# Patient Record
Sex: FEMALE | Race: ASIAN | Employment: UNEMPLOYED | URBAN - METROPOLITAN AREA
[De-identification: names, ages, dates, MRNs, and addresses within clinical notes are randomized per-mention and may not be internally consistent; named-entity substitution may affect disease eponyms.]

---

## 2019-07-31 ENCOUNTER — OFFICE VISIT (OUTPATIENT)
Dept: INTERNAL MEDICINE CLINIC | Facility: CLINIC | Age: 39
End: 2019-07-31
Payer: COMMERCIAL

## 2019-07-31 VITALS
HEART RATE: 59 BPM | HEIGHT: 64 IN | SYSTOLIC BLOOD PRESSURE: 124 MMHG | BODY MASS INDEX: 35.17 KG/M2 | TEMPERATURE: 98 F | WEIGHT: 206 LBS | DIASTOLIC BLOOD PRESSURE: 80 MMHG

## 2019-07-31 DIAGNOSIS — Z00.00 ANNUAL PHYSICAL EXAM: Primary | ICD-10-CM

## 2019-07-31 PROCEDURE — 99385 PREV VISIT NEW AGE 18-39: CPT | Performed by: INTERNAL MEDICINE

## 2019-07-31 NOTE — PROGRESS NOTES
HPI:    Patient ID: Tomy Dumas is a 42year old female. Patient presents today for her annual physical. She has no specific complaint. Review of Systems   Constitutional: Negative. HENT: Negative. Eyes: Negative. Respiratory: Negative. noted. She is not diaphoretic. No erythema. No pallor. Psychiatric: She has a normal mood and affect.               ASSESSMENT/PLAN:   (Z00.00) Annual physical exam  (primary encounter diagnosis)  Plan: CBC WITH DIFFERENTIAL WITH PLATELET, COMP         ME

## 2019-08-02 ENCOUNTER — LAB ENCOUNTER (OUTPATIENT)
Dept: LAB | Age: 39
End: 2019-08-02
Attending: INTERNAL MEDICINE
Payer: COMMERCIAL

## 2019-08-02 DIAGNOSIS — Z00.00 ANNUAL PHYSICAL EXAM: ICD-10-CM

## 2019-08-02 LAB
ALBUMIN SERPL-MCNC: 3.7 G/DL (ref 3.4–5)
ALBUMIN/GLOB SERPL: 1 {RATIO} (ref 1–2)
ALP LIVER SERPL-CCNC: 66 U/L (ref 37–98)
ALT SERPL-CCNC: 18 U/L (ref 13–56)
ANION GAP SERPL CALC-SCNC: 6 MMOL/L (ref 0–18)
AST SERPL-CCNC: 14 U/L (ref 15–37)
BASOPHILS # BLD AUTO: 0.04 X10(3) UL (ref 0–0.2)
BASOPHILS NFR BLD AUTO: 0.7 %
BILIRUB SERPL-MCNC: 0.4 MG/DL (ref 0.1–2)
BUN BLD-MCNC: 8 MG/DL (ref 7–18)
BUN/CREAT SERPL: 12.9 (ref 10–20)
CALCIUM BLD-MCNC: 8.3 MG/DL (ref 8.5–10.1)
CHLORIDE SERPL-SCNC: 110 MMOL/L (ref 98–112)
CHOLEST SMN-MCNC: 164 MG/DL (ref ?–200)
CO2 SERPL-SCNC: 25 MMOL/L (ref 21–32)
CREAT BLD-MCNC: 0.62 MG/DL (ref 0.55–1.02)
DEPRECATED RDW RBC AUTO: 47.9 FL (ref 35.1–46.3)
EOSINOPHIL # BLD AUTO: 0.12 X10(3) UL (ref 0–0.7)
EOSINOPHIL NFR BLD AUTO: 2.1 %
ERYTHROCYTE [DISTWIDTH] IN BLOOD BY AUTOMATED COUNT: 14.8 % (ref 11–15)
GLOBULIN PLAS-MCNC: 3.8 G/DL (ref 2.8–4.4)
GLUCOSE BLD-MCNC: 95 MG/DL (ref 70–99)
HCT VFR BLD AUTO: 43.3 % (ref 35–48)
HDLC SERPL-MCNC: 40 MG/DL (ref 40–59)
HGB BLD-MCNC: 14.1 G/DL (ref 12–16)
IMM GRANULOCYTES # BLD AUTO: 0.01 X10(3) UL (ref 0–1)
IMM GRANULOCYTES NFR BLD: 0.2 %
LDLC SERPL CALC-MCNC: 99 MG/DL (ref ?–100)
LYMPHOCYTES # BLD AUTO: 2.06 X10(3) UL (ref 1–4)
LYMPHOCYTES NFR BLD AUTO: 36 %
M PROTEIN MFR SERPL ELPH: 7.5 G/DL (ref 6.4–8.2)
MCH RBC QN AUTO: 28.9 PG (ref 26–34)
MCHC RBC AUTO-ENTMCNC: 32.6 G/DL (ref 31–37)
MCV RBC AUTO: 88.7 FL (ref 80–100)
MONOCYTES # BLD AUTO: 0.44 X10(3) UL (ref 0.1–1)
MONOCYTES NFR BLD AUTO: 7.7 %
NEUTROPHILS # BLD AUTO: 3.05 X10 (3) UL (ref 1.5–7.7)
NEUTROPHILS # BLD AUTO: 3.05 X10(3) UL (ref 1.5–7.7)
NEUTROPHILS NFR BLD AUTO: 53.3 %
NONHDLC SERPL-MCNC: 124 MG/DL (ref ?–130)
OSMOLALITY SERPL CALC.SUM OF ELEC: 290 MOSM/KG (ref 275–295)
PATIENT FASTING: YES
PATIENT FASTING: YES
PLATELET # BLD AUTO: 343 10(3)UL (ref 150–450)
POTASSIUM SERPL-SCNC: 3.6 MMOL/L (ref 3.5–5.1)
RBC # BLD AUTO: 4.88 X10(6)UL (ref 3.8–5.3)
SODIUM SERPL-SCNC: 141 MMOL/L (ref 136–145)
TRIGL SERPL-MCNC: 123 MG/DL (ref 30–149)
VLDLC SERPL CALC-MCNC: 25 MG/DL (ref 0–30)
WBC # BLD AUTO: 5.7 X10(3) UL (ref 4–11)

## 2019-08-02 PROCEDURE — 80061 LIPID PANEL: CPT

## 2019-08-02 PROCEDURE — 80053 COMPREHEN METABOLIC PANEL: CPT

## 2019-08-02 PROCEDURE — 36415 COLL VENOUS BLD VENIPUNCTURE: CPT

## 2019-08-02 PROCEDURE — 85025 COMPLETE CBC W/AUTO DIFF WBC: CPT

## 2019-08-06 ENCOUNTER — OFFICE VISIT (OUTPATIENT)
Dept: OBGYN CLINIC | Facility: CLINIC | Age: 39
End: 2019-08-06
Payer: COMMERCIAL

## 2019-08-06 VITALS
WEIGHT: 203.81 LBS | SYSTOLIC BLOOD PRESSURE: 139 MMHG | DIASTOLIC BLOOD PRESSURE: 91 MMHG | HEIGHT: 64 IN | BODY MASS INDEX: 34.8 KG/M2

## 2019-08-06 DIAGNOSIS — Z01.419 WELL WOMAN EXAM WITH ROUTINE GYNECOLOGICAL EXAM: Primary | ICD-10-CM

## 2019-08-06 PROCEDURE — 99385 PREV VISIT NEW AGE 18-39: CPT | Performed by: OBSTETRICS & GYNECOLOGY

## 2019-08-06 NOTE — PROGRESS NOTES
Annual Gyn Exam    HPI Mercedez Bo is a new patient to me and is here for an annual gynecologic evaluation. She is currently without complaints.     OB History    Para Term  AB Living   1 1 1     1   SAB TAB Ectopic Multiple Live Births           1 headaches. Physical Exam   Constitutional: She is oriented to person, place, and time. She appears well-nourished. No distress. HENT:   Mouth/Throat: Oropharynx is clear and moist. No oropharyngeal exudate. Neck: No thyromegaly present.    Cardiov COLLECTION;  Future        Severa Dixons, MD, MD

## 2019-08-07 LAB — HPV I/H RISK 1 DNA SPEC QL NAA+PROBE: NEGATIVE

## 2019-11-15 ENCOUNTER — IMMUNIZATION (OUTPATIENT)
Dept: FAMILY MEDICINE CLINIC | Facility: CLINIC | Age: 39
End: 2019-11-15
Payer: COMMERCIAL

## 2019-11-15 DIAGNOSIS — Z23 NEED FOR VACCINATION: ICD-10-CM

## 2019-11-15 PROCEDURE — 90471 IMMUNIZATION ADMIN: CPT | Performed by: NURSE PRACTITIONER

## 2019-11-15 PROCEDURE — 90686 IIV4 VACC NO PRSV 0.5 ML IM: CPT | Performed by: NURSE PRACTITIONER

## 2020-07-29 ENCOUNTER — OFFICE VISIT (OUTPATIENT)
Dept: INTERNAL MEDICINE CLINIC | Facility: CLINIC | Age: 40
End: 2020-07-29
Payer: COMMERCIAL

## 2020-07-29 ENCOUNTER — LAB ENCOUNTER (OUTPATIENT)
Dept: LAB | Age: 40
End: 2020-07-29
Attending: INTERNAL MEDICINE
Payer: COMMERCIAL

## 2020-07-29 VITALS
RESPIRATION RATE: 12 BRPM | HEIGHT: 64 IN | BODY MASS INDEX: 35 KG/M2 | DIASTOLIC BLOOD PRESSURE: 80 MMHG | WEIGHT: 205 LBS | HEART RATE: 66 BPM | SYSTOLIC BLOOD PRESSURE: 120 MMHG | TEMPERATURE: 99 F

## 2020-07-29 DIAGNOSIS — Z00.00 ANNUAL PHYSICAL EXAM: ICD-10-CM

## 2020-07-29 DIAGNOSIS — Z00.00 ANNUAL PHYSICAL EXAM: Primary | ICD-10-CM

## 2020-07-29 LAB
ALBUMIN SERPL-MCNC: 3.8 G/DL (ref 3.4–5)
ALBUMIN/GLOB SERPL: 1 {RATIO} (ref 1–2)
ALP LIVER SERPL-CCNC: 60 U/L (ref 37–98)
ALT SERPL-CCNC: 32 U/L (ref 13–56)
ANION GAP SERPL CALC-SCNC: 4 MMOL/L (ref 0–18)
AST SERPL-CCNC: 15 U/L (ref 15–37)
BASOPHILS # BLD AUTO: 0.04 X10(3) UL (ref 0–0.2)
BASOPHILS NFR BLD AUTO: 0.6 %
BILIRUB SERPL-MCNC: 0.5 MG/DL (ref 0.1–2)
BUN BLD-MCNC: 14 MG/DL (ref 7–18)
BUN/CREAT SERPL: 25 (ref 10–20)
CALCIUM BLD-MCNC: 8.2 MG/DL (ref 8.5–10.1)
CHLORIDE SERPL-SCNC: 108 MMOL/L (ref 98–112)
CHOLEST SMN-MCNC: 191 MG/DL (ref ?–200)
CO2 SERPL-SCNC: 26 MMOL/L (ref 21–32)
CREAT BLD-MCNC: 0.56 MG/DL (ref 0.55–1.02)
DEPRECATED RDW RBC AUTO: 44.1 FL (ref 35.1–46.3)
EOSINOPHIL # BLD AUTO: 0.09 X10(3) UL (ref 0–0.7)
EOSINOPHIL NFR BLD AUTO: 1.3 %
ERYTHROCYTE [DISTWIDTH] IN BLOOD BY AUTOMATED COUNT: 13 % (ref 11–15)
GLOBULIN PLAS-MCNC: 3.9 G/DL (ref 2.8–4.4)
GLUCOSE BLD-MCNC: 86 MG/DL (ref 70–99)
HCT VFR BLD AUTO: 44 % (ref 35–48)
HDLC SERPL-MCNC: 33 MG/DL (ref 40–59)
HGB BLD-MCNC: 14.3 G/DL (ref 12–16)
IMM GRANULOCYTES # BLD AUTO: 0.01 X10(3) UL (ref 0–1)
IMM GRANULOCYTES NFR BLD: 0.1 %
LDLC SERPL CALC-MCNC: 136 MG/DL (ref ?–100)
LYMPHOCYTES # BLD AUTO: 2.12 X10(3) UL (ref 1–4)
LYMPHOCYTES NFR BLD AUTO: 31.5 %
M PROTEIN MFR SERPL ELPH: 7.7 G/DL (ref 6.4–8.2)
MCH RBC QN AUTO: 30.2 PG (ref 26–34)
MCHC RBC AUTO-ENTMCNC: 32.5 G/DL (ref 31–37)
MCV RBC AUTO: 93 FL (ref 80–100)
MONOCYTES # BLD AUTO: 0.46 X10(3) UL (ref 0.1–1)
MONOCYTES NFR BLD AUTO: 6.8 %
NEUTROPHILS # BLD AUTO: 4 X10 (3) UL (ref 1.5–7.7)
NEUTROPHILS # BLD AUTO: 4 X10(3) UL (ref 1.5–7.7)
NEUTROPHILS NFR BLD AUTO: 59.7 %
NONHDLC SERPL-MCNC: 158 MG/DL (ref ?–130)
OSMOLALITY SERPL CALC.SUM OF ELEC: 286 MOSM/KG (ref 275–295)
PATIENT FASTING Y/N/NP: YES
PATIENT FASTING Y/N/NP: YES
PLATELET # BLD AUTO: 323 10(3)UL (ref 150–450)
POTASSIUM SERPL-SCNC: 3.6 MMOL/L (ref 3.5–5.1)
RBC # BLD AUTO: 4.73 X10(6)UL (ref 3.8–5.3)
SODIUM SERPL-SCNC: 138 MMOL/L (ref 136–145)
TRIGL SERPL-MCNC: 109 MG/DL (ref 30–149)
TSI SER-ACNC: 3.37 MIU/ML (ref 0.36–3.74)
VLDLC SERPL CALC-MCNC: 22 MG/DL (ref 0–30)
WBC # BLD AUTO: 6.7 X10(3) UL (ref 4–11)

## 2020-07-29 PROCEDURE — 3008F BODY MASS INDEX DOCD: CPT | Performed by: INTERNAL MEDICINE

## 2020-07-29 PROCEDURE — 80053 COMPREHEN METABOLIC PANEL: CPT

## 2020-07-29 PROCEDURE — 85025 COMPLETE CBC W/AUTO DIFF WBC: CPT

## 2020-07-29 PROCEDURE — 3079F DIAST BP 80-89 MM HG: CPT | Performed by: INTERNAL MEDICINE

## 2020-07-29 PROCEDURE — 84443 ASSAY THYROID STIM HORMONE: CPT

## 2020-07-29 PROCEDURE — 36415 COLL VENOUS BLD VENIPUNCTURE: CPT

## 2020-07-29 PROCEDURE — 99395 PREV VISIT EST AGE 18-39: CPT | Performed by: INTERNAL MEDICINE

## 2020-07-29 PROCEDURE — 3074F SYST BP LT 130 MM HG: CPT | Performed by: INTERNAL MEDICINE

## 2020-07-29 PROCEDURE — 80061 LIPID PANEL: CPT

## 2020-07-29 RX ORDER — ZINC SULFATE CAP 220 MG (50 MG ELEMENTAL ZN) 220 (50 ZN) MG
50 CAP ORAL DAILY
COMMUNITY

## 2020-07-29 RX ORDER — MULTIVIT WITH MINERALS/LUTEIN
1000 TABLET ORAL DAILY
COMMUNITY

## 2020-07-29 NOTE — PROGRESS NOTES
HPI:    Patient ID: Inés Renteria is a 47year old female. Patient presents today for her annual physical examination otherwise had no complaints. Review of Systems   Constitutional: Negative.   Negative for appetite change, fever and unexpected weig no rales. Abdominal: Soft. Bowel sounds are normal. She exhibits no distension and no mass. There is no tenderness. There is no rebound and no guarding. Musculoskeletal: Normal range of motion. General: No tenderness or edema.    Lymphadenopathy

## 2020-08-02 ENCOUNTER — TELEPHONE (OUTPATIENT)
Dept: INTERNAL MEDICINE CLINIC | Facility: CLINIC | Age: 40
End: 2020-08-02

## 2020-08-02 DIAGNOSIS — R79.89 LOW SERUM CALCIUM: Primary | ICD-10-CM

## 2020-08-11 ENCOUNTER — APPOINTMENT (OUTPATIENT)
Dept: LAB | Age: 40
End: 2020-08-11
Attending: INTERNAL MEDICINE
Payer: COMMERCIAL

## 2020-08-11 ENCOUNTER — OFFICE VISIT (OUTPATIENT)
Dept: OBGYN CLINIC | Facility: CLINIC | Age: 40
End: 2020-08-11
Payer: COMMERCIAL

## 2020-08-11 VITALS — BODY MASS INDEX: 34 KG/M2 | DIASTOLIC BLOOD PRESSURE: 65 MMHG | WEIGHT: 198 LBS | SYSTOLIC BLOOD PRESSURE: 121 MMHG

## 2020-08-11 DIAGNOSIS — Z30.09 GENERAL COUNSELING AND ADVICE ON FEMALE CONTRACEPTION: ICD-10-CM

## 2020-08-11 DIAGNOSIS — Z01.419 WELL WOMAN EXAM WITH ROUTINE GYNECOLOGICAL EXAM: Primary | ICD-10-CM

## 2020-08-11 DIAGNOSIS — R79.89 LOW SERUM CALCIUM: ICD-10-CM

## 2020-08-11 LAB — PTH-INTACT SERPL-MCNC: 80.4 PG/ML (ref 18.5–88)

## 2020-08-11 PROCEDURE — 82330 ASSAY OF CALCIUM: CPT

## 2020-08-11 PROCEDURE — 82306 VITAMIN D 25 HYDROXY: CPT

## 2020-08-11 PROCEDURE — 36415 COLL VENOUS BLD VENIPUNCTURE: CPT

## 2020-08-11 PROCEDURE — 99395 PREV VISIT EST AGE 18-39: CPT | Performed by: OBSTETRICS & GYNECOLOGY

## 2020-08-11 PROCEDURE — 3074F SYST BP LT 130 MM HG: CPT | Performed by: OBSTETRICS & GYNECOLOGY

## 2020-08-11 PROCEDURE — 83970 ASSAY OF PARATHORMONE: CPT

## 2020-08-11 PROCEDURE — 3078F DIAST BP <80 MM HG: CPT | Performed by: OBSTETRICS & GYNECOLOGY

## 2020-08-11 NOTE — PROGRESS NOTES
Annual Gyn Exam    HPI  Margaret Regalado is a known patient of mine and is here for an annual gynecologic evaluation. She is without any complaints.     OB History    Para Term  AB Living   1 1 1     1   SAB TAB Ectopic Multiple Live Births           1 Systems   Constitutional: Negative for chills, fatigue and fever. HENT: Negative for hearing loss. Eyes: Negative for visual disturbance. Respiratory: Negative for cough, shortness of breath and wheezing.     Cardiovascular: Negative for palpitations exam with routine gynecological exam  A: 47 y.o.  here for annual gynecologic evaluation  Exam, self breast exam done  Pt not due for pap due to normal Pap and negative HPV last year. .  Discussed contraception and patient considering IUDs.   Discusse

## 2020-08-12 LAB — 25(OH)D3 SERPL-MCNC: 30.4 NG/ML (ref 30–100)

## 2020-08-14 LAB
CALCIUM IONIZED PH 7.4: 1.23 MMOL/L
CALCIUM IONIZED, SERUM: 1.3 MMOL/L

## 2020-10-30 ENCOUNTER — IMMUNIZATION (OUTPATIENT)
Dept: FAMILY MEDICINE CLINIC | Facility: CLINIC | Age: 40
End: 2020-10-30
Payer: COMMERCIAL

## 2020-10-30 PROCEDURE — 90686 IIV4 VACC NO PRSV 0.5 ML IM: CPT | Performed by: NURSE PRACTITIONER

## 2020-10-30 PROCEDURE — 90471 IMMUNIZATION ADMIN: CPT | Performed by: NURSE PRACTITIONER

## 2021-04-09 DIAGNOSIS — Z23 NEED FOR VACCINATION: ICD-10-CM

## 2021-04-14 ENCOUNTER — IMMUNIZATION (OUTPATIENT)
Dept: LAB | Age: 41
End: 2021-04-14
Attending: HOSPITALIST
Payer: COMMERCIAL

## 2021-04-14 DIAGNOSIS — Z23 NEED FOR VACCINATION: Primary | ICD-10-CM

## 2021-04-14 PROCEDURE — 0001A SARSCOV2 VAC 30MCG/0.3ML IM: CPT

## 2021-05-08 ENCOUNTER — IMMUNIZATION (OUTPATIENT)
Dept: LAB | Age: 41
End: 2021-05-08
Attending: HOSPITALIST
Payer: COMMERCIAL

## 2021-05-08 DIAGNOSIS — Z23 NEED FOR VACCINATION: Primary | ICD-10-CM

## 2021-05-08 PROCEDURE — 0002A SARSCOV2 VAC 30MCG/0.3ML IM: CPT

## 2021-08-03 ENCOUNTER — LAB ENCOUNTER (OUTPATIENT)
Dept: LAB | Age: 41
End: 2021-08-03
Attending: INTERNAL MEDICINE
Payer: COMMERCIAL

## 2021-08-03 ENCOUNTER — OFFICE VISIT (OUTPATIENT)
Dept: INTERNAL MEDICINE CLINIC | Facility: CLINIC | Age: 41
End: 2021-08-03
Payer: COMMERCIAL

## 2021-08-03 VITALS
HEART RATE: 59 BPM | DIASTOLIC BLOOD PRESSURE: 78 MMHG | TEMPERATURE: 96 F | SYSTOLIC BLOOD PRESSURE: 124 MMHG | WEIGHT: 189 LBS | HEIGHT: 64 IN | RESPIRATION RATE: 12 BRPM | BODY MASS INDEX: 32.27 KG/M2

## 2021-08-03 DIAGNOSIS — Z00.00 ANNUAL PHYSICAL EXAM: Primary | ICD-10-CM

## 2021-08-03 DIAGNOSIS — Z00.00 ANNUAL PHYSICAL EXAM: ICD-10-CM

## 2021-08-03 DIAGNOSIS — R51.9 CHRONIC NONINTRACTABLE HEADACHE, UNSPECIFIED HEADACHE TYPE: ICD-10-CM

## 2021-08-03 DIAGNOSIS — G89.29 CHRONIC NONINTRACTABLE HEADACHE, UNSPECIFIED HEADACHE TYPE: ICD-10-CM

## 2021-08-03 DIAGNOSIS — M54.50 INTERMITTENT LOW BACK PAIN: ICD-10-CM

## 2021-08-03 LAB
ALBUMIN SERPL-MCNC: 4 G/DL (ref 3.4–5)
ALBUMIN/GLOB SERPL: 1.1 {RATIO} (ref 1–2)
ALP LIVER SERPL-CCNC: 43 U/L
ALT SERPL-CCNC: 20 U/L
ANION GAP SERPL CALC-SCNC: 7 MMOL/L (ref 0–18)
AST SERPL-CCNC: 12 U/L (ref 15–37)
BASOPHILS # BLD AUTO: 0.05 X10(3) UL (ref 0–0.2)
BASOPHILS NFR BLD AUTO: 0.9 %
BILIRUB SERPL-MCNC: 0.4 MG/DL (ref 0.1–2)
BUN BLD-MCNC: 12 MG/DL (ref 7–18)
BUN/CREAT SERPL: 19.4 (ref 10–20)
CALCIUM BLD-MCNC: 8.8 MG/DL (ref 8.5–10.1)
CHLORIDE SERPL-SCNC: 108 MMOL/L (ref 98–112)
CHOLEST SMN-MCNC: 177 MG/DL (ref ?–200)
CO2 SERPL-SCNC: 25 MMOL/L (ref 21–32)
CREAT BLD-MCNC: 0.62 MG/DL
DEPRECATED RDW RBC AUTO: 41.6 FL (ref 35.1–46.3)
EOSINOPHIL # BLD AUTO: 0.08 X10(3) UL (ref 0–0.7)
EOSINOPHIL NFR BLD AUTO: 1.5 %
ERYTHROCYTE [DISTWIDTH] IN BLOOD BY AUTOMATED COUNT: 12.2 % (ref 11–15)
GLOBULIN PLAS-MCNC: 3.7 G/DL (ref 2.8–4.4)
GLUCOSE BLD-MCNC: 91 MG/DL (ref 70–99)
HCT VFR BLD AUTO: 43.1 %
HDLC SERPL-MCNC: 39 MG/DL (ref 40–59)
HGB BLD-MCNC: 14.3 G/DL
IMM GRANULOCYTES # BLD AUTO: 0 X10(3) UL (ref 0–1)
IMM GRANULOCYTES NFR BLD: 0 %
LDLC SERPL CALC-MCNC: 121 MG/DL (ref ?–100)
LYMPHOCYTES # BLD AUTO: 2.35 X10(3) UL (ref 1–4)
LYMPHOCYTES NFR BLD AUTO: 43 %
M PROTEIN MFR SERPL ELPH: 7.7 G/DL (ref 6.4–8.2)
MCH RBC QN AUTO: 30.6 PG (ref 26–34)
MCHC RBC AUTO-ENTMCNC: 33.2 G/DL (ref 31–37)
MCV RBC AUTO: 92.1 FL
MONOCYTES # BLD AUTO: 0.37 X10(3) UL (ref 0.1–1)
MONOCYTES NFR BLD AUTO: 6.8 %
NEUTROPHILS # BLD AUTO: 2.62 X10 (3) UL (ref 1.5–7.7)
NEUTROPHILS # BLD AUTO: 2.62 X10(3) UL (ref 1.5–7.7)
NEUTROPHILS NFR BLD AUTO: 47.8 %
NONHDLC SERPL-MCNC: 138 MG/DL (ref ?–130)
OSMOLALITY SERPL CALC.SUM OF ELEC: 289 MOSM/KG (ref 275–295)
PATIENT FASTING Y/N/NP: YES
PATIENT FASTING Y/N/NP: YES
PLATELET # BLD AUTO: 332 10(3)UL (ref 150–450)
POTASSIUM SERPL-SCNC: 3.9 MMOL/L (ref 3.5–5.1)
RBC # BLD AUTO: 4.68 X10(6)UL
SODIUM SERPL-SCNC: 140 MMOL/L (ref 136–145)
TRIGL SERPL-MCNC: 90 MG/DL (ref 30–149)
TSI SER-ACNC: 2.78 MIU/ML (ref 0.36–3.74)
VLDLC SERPL CALC-MCNC: 16 MG/DL (ref 0–30)
WBC # BLD AUTO: 5.5 X10(3) UL (ref 4–11)

## 2021-08-03 PROCEDURE — 36415 COLL VENOUS BLD VENIPUNCTURE: CPT

## 2021-08-03 PROCEDURE — 80053 COMPREHEN METABOLIC PANEL: CPT

## 2021-08-03 PROCEDURE — 85025 COMPLETE CBC W/AUTO DIFF WBC: CPT

## 2021-08-03 PROCEDURE — 3008F BODY MASS INDEX DOCD: CPT | Performed by: INTERNAL MEDICINE

## 2021-08-03 PROCEDURE — 80061 LIPID PANEL: CPT

## 2021-08-03 PROCEDURE — 84443 ASSAY THYROID STIM HORMONE: CPT

## 2021-08-03 PROCEDURE — 99395 PREV VISIT EST AGE 18-39: CPT | Performed by: INTERNAL MEDICINE

## 2021-08-03 PROCEDURE — 3078F DIAST BP <80 MM HG: CPT | Performed by: INTERNAL MEDICINE

## 2021-08-03 PROCEDURE — 3074F SYST BP LT 130 MM HG: CPT | Performed by: INTERNAL MEDICINE

## 2021-08-03 NOTE — PROGRESS NOTES
HPI/Subjective:     Patient ID: Maris Meek is a 46year old female. Patient presents today for her annual physical exam.      History/Other:   Review of Systems   Constitutional: Negative. Respiratory: Negative.     Cardiovascular: Negati Head: Normocephalic and atraumatic. Right Ear: External ear normal.      Left Ear: External ear normal.      Nose: Nose normal.   Eyes:      General: No scleral icterus. Right eye: No discharge. Left eye: No discharge.       Conjunctiva INTERNAL       Pt had requested to see chiro for intermittent upper back and low back pain, so referral given .    (Z68.32) BMI 32.0-32.9,adult  Plan: pt had been modified keto diet which had helped her lose weight.          Orders Placed This Encounter

## 2021-08-13 ENCOUNTER — OFFICE VISIT (OUTPATIENT)
Dept: OBGYN CLINIC | Facility: CLINIC | Age: 41
End: 2021-08-13
Payer: COMMERCIAL

## 2021-08-13 VITALS — SYSTOLIC BLOOD PRESSURE: 141 MMHG | WEIGHT: 193 LBS | BODY MASS INDEX: 33 KG/M2 | DIASTOLIC BLOOD PRESSURE: 90 MMHG

## 2021-08-13 DIAGNOSIS — Z01.419 WOMEN'S ANNUAL ROUTINE GYNECOLOGICAL EXAMINATION: Primary | ICD-10-CM

## 2021-08-13 PROCEDURE — 99395 PREV VISIT EST AGE 18-39: CPT | Performed by: OBSTETRICS & GYNECOLOGY

## 2021-08-13 PROCEDURE — 3077F SYST BP >= 140 MM HG: CPT | Performed by: OBSTETRICS & GYNECOLOGY

## 2021-08-13 PROCEDURE — 3080F DIAST BP >= 90 MM HG: CPT | Performed by: OBSTETRICS & GYNECOLOGY

## 2021-08-16 LAB — HPV I/H RISK 1 DNA SPEC QL NAA+PROBE: NEGATIVE

## 2021-08-16 NOTE — PROGRESS NOTES
HPI:   Chrystine Smoker is a 46year old female who presents for an annual/pap.       Wt Readings from Last 6 Encounters:  08/13/21 : 193 lb (87.5 kg)  08/03/21 : 189 lb (85.7 kg)  08/11/20 : 198 lb (89.8 kg)  07/29/20 : 205 lb (93 kg)  08/06/19 : 20 GENERAL: feels well otherwise  SKIN: denies any unusual skin lesions  EYES:denies blurred vision or double vision  HEENT: denies nasal congestion, sinus pain or ST  LUNGS: denies shortness of breath with exertion  CARDIOVASCULAR: denies chest pain on exe

## 2021-08-19 LAB — LAST PAP RESULT: NORMAL

## 2021-10-16 ENCOUNTER — IMMUNIZATION (OUTPATIENT)
Dept: FAMILY MEDICINE CLINIC | Facility: CLINIC | Age: 41
End: 2021-10-16
Payer: COMMERCIAL

## 2021-10-16 DIAGNOSIS — Z23 NEED FOR INFLUENZA VACCINATION: Primary | ICD-10-CM

## 2021-10-16 PROCEDURE — 90686 IIV4 VACC NO PRSV 0.5 ML IM: CPT | Performed by: NURSE PRACTITIONER

## 2021-10-16 PROCEDURE — 90471 IMMUNIZATION ADMIN: CPT | Performed by: NURSE PRACTITIONER

## 2021-12-04 ENCOUNTER — IMMUNIZATION (OUTPATIENT)
Dept: LAB | Facility: HOSPITAL | Age: 41
End: 2021-12-04
Attending: EMERGENCY MEDICINE
Payer: COMMERCIAL

## 2021-12-04 DIAGNOSIS — Z23 NEED FOR VACCINATION: Primary | ICD-10-CM

## 2021-12-04 PROCEDURE — 0004A SARSCOV2 VAC 30MCG/0.3ML IM: CPT

## 2022-01-06 ENCOUNTER — MED REC SCAN ONLY (OUTPATIENT)
Dept: INTERNAL MEDICINE CLINIC | Facility: CLINIC | Age: 42
End: 2022-01-06

## 2022-02-07 ENCOUNTER — HOSPITAL ENCOUNTER (OUTPATIENT)
Dept: GENERAL RADIOLOGY | Age: 42
Discharge: HOME | End: 2022-02-07
Attending: INTERNAL MEDICINE
Payer: COMMERCIAL

## 2022-02-07 ENCOUNTER — OFFICE VISIT (OUTPATIENT)
Dept: INTERNAL MEDICINE CLINIC | Facility: CLINIC | Age: 42
End: 2022-02-07
Payer: COMMERCIAL

## 2022-02-07 VITALS
OXYGEN SATURATION: 98 % | SYSTOLIC BLOOD PRESSURE: 124 MMHG | DIASTOLIC BLOOD PRESSURE: 84 MMHG | HEIGHT: 64 IN | RESPIRATION RATE: 12 BRPM | BODY MASS INDEX: 36.19 KG/M2 | WEIGHT: 212 LBS | HEART RATE: 70 BPM | TEMPERATURE: 99 F

## 2022-02-07 DIAGNOSIS — R05.9 COUGH: ICD-10-CM

## 2022-02-07 DIAGNOSIS — U07.1 COVID-19 VIRUS INFECTION: ICD-10-CM

## 2022-02-07 DIAGNOSIS — R05.9 COUGH: Primary | ICD-10-CM

## 2022-02-07 PROCEDURE — 3079F DIAST BP 80-89 MM HG: CPT | Performed by: INTERNAL MEDICINE

## 2022-02-07 PROCEDURE — 99214 OFFICE O/P EST MOD 30 MIN: CPT | Performed by: INTERNAL MEDICINE

## 2022-02-07 PROCEDURE — 71046 X-RAY EXAM CHEST 2 VIEWS: CPT | Performed by: INTERNAL MEDICINE

## 2022-02-07 PROCEDURE — 3074F SYST BP LT 130 MM HG: CPT | Performed by: INTERNAL MEDICINE

## 2022-02-07 PROCEDURE — 3008F BODY MASS INDEX DOCD: CPT | Performed by: INTERNAL MEDICINE

## 2022-02-07 RX ORDER — COVID-19 TEST SPECIMEN COLLECT
MISCELLANEOUS MISCELLANEOUS
COMMUNITY
Start: 2022-01-15

## 2022-02-07 RX ORDER — DEXAMETHASONE 4 MG/1
2 TABLET ORAL 2 TIMES DAILY
Qty: 1 EACH | Refills: 0 | Status: SHIPPED | OUTPATIENT
Start: 2022-02-07

## 2022-08-16 ENCOUNTER — HOSPITAL ENCOUNTER (OUTPATIENT)
Dept: MAMMOGRAPHY | Age: 42
Discharge: HOME | End: 2022-08-16
Attending: INTERNAL MEDICINE
Payer: COMMERCIAL

## 2022-08-16 DIAGNOSIS — Z12.31 ENCOUNTER FOR SCREENING MAMMOGRAM FOR MALIGNANT NEOPLASM OF BREAST: ICD-10-CM

## 2022-08-16 PROCEDURE — 77067 SCR MAMMO BI INCL CAD: CPT | Performed by: INTERNAL MEDICINE

## 2022-08-16 PROCEDURE — 77063 BREAST TOMOSYNTHESIS BI: CPT | Performed by: INTERNAL MEDICINE

## 2022-08-18 ENCOUNTER — PATIENT MESSAGE (OUTPATIENT)
Dept: INTERNAL MEDICINE CLINIC | Facility: CLINIC | Age: 42
End: 2022-08-18

## 2022-08-18 NOTE — TELEPHONE ENCOUNTER
From: Jyoti Ricks  To: Campos Cifuentes MD  Sent: 8/18/2022 4:43 PM CDT  Subject: Additional tests needed from screening mammo    Hi Dr Linda Lal! I'll be doing my annual visit with you this Thursday and as you can see, I had a screening mammogram when I saw in the vinnie that you noted I should have it. So it seems I need additional imaging as you have noted as well in the vinnie (Diagnostic Mammogram and/or US one breast). My question is would it be OK to get the imaging other than at the hospital? We have had great experience at [facility] [address] [facility] at [place]. Would we need your referral? Or should I just wait for appointment on Thursday so we can discuss it?  Thank you so much!!!

## 2022-08-19 NOTE — TELEPHONE ENCOUNTER
Verified name and  of patient. Chika from [facility] calling to state that patient advised her that she will be getting the additional imaging elsewhere- as seen in patient's MyChart message.

## 2022-08-22 NOTE — TELEPHONE ENCOUNTER
Patient called office. Patient's date of birth and full name both confirmed. Asking if order has been faxed to  [facility] in [facility] (not [facility]). RN informed her that it was Faxed on 8/18/22. Advised her to contact [facility] in [facility] to schedule. She verbalizes understanding of all information, and agreeable to plan.

## 2022-08-25 ENCOUNTER — OFFICE VISIT (OUTPATIENT)
Dept: INTERNAL MEDICINE CLINIC | Facility: CLINIC | Age: 42
End: 2022-08-25
Payer: COMMERCIAL

## 2022-08-25 ENCOUNTER — LAB ENCOUNTER (OUTPATIENT)
Dept: LAB | Age: 42
End: 2022-08-25
Attending: INTERNAL MEDICINE
Payer: COMMERCIAL

## 2022-08-25 VITALS
TEMPERATURE: 98 F | HEIGHT: 64 IN | WEIGHT: 222.88 LBS | SYSTOLIC BLOOD PRESSURE: 126 MMHG | OXYGEN SATURATION: 98 % | DIASTOLIC BLOOD PRESSURE: 80 MMHG | HEART RATE: 67 BPM | BODY MASS INDEX: 38.05 KG/M2

## 2022-08-25 DIAGNOSIS — E78.5 DYSLIPIDEMIA: ICD-10-CM

## 2022-08-25 DIAGNOSIS — G89.29 CHRONIC NONINTRACTABLE HEADACHE, UNSPECIFIED HEADACHE TYPE: ICD-10-CM

## 2022-08-25 DIAGNOSIS — Z00.00 ANNUAL PHYSICAL EXAM: Primary | ICD-10-CM

## 2022-08-25 DIAGNOSIS — R51.9 CHRONIC NONINTRACTABLE HEADACHE, UNSPECIFIED HEADACHE TYPE: ICD-10-CM

## 2022-08-25 DIAGNOSIS — Z00.00 ANNUAL PHYSICAL EXAM: ICD-10-CM

## 2022-08-25 LAB
ALBUMIN SERPL-MCNC: 3.5 G/DL (ref 3.4–5)
ALBUMIN/GLOB SERPL: 0.9 {RATIO} (ref 1–2)
ALP LIVER SERPL-CCNC: 78 U/L
ALT SERPL-CCNC: 35 U/L
ANION GAP SERPL CALC-SCNC: 6 MMOL/L (ref 0–18)
AST SERPL-CCNC: 19 U/L (ref 15–37)
BASOPHILS # BLD AUTO: 0.05 X10(3) UL (ref 0–0.2)
BASOPHILS NFR BLD AUTO: 0.7 %
BILIRUB SERPL-MCNC: 0.5 MG/DL (ref 0.1–2)
BUN BLD-MCNC: 10 MG/DL (ref 7–18)
BUN/CREAT SERPL: 16.4 (ref 10–20)
CALCIUM BLD-MCNC: 8.7 MG/DL (ref 8.5–10.1)
CHLORIDE SERPL-SCNC: 107 MMOL/L (ref 98–112)
CHOLEST SERPL-MCNC: 185 MG/DL (ref ?–200)
CO2 SERPL-SCNC: 25 MMOL/L (ref 21–32)
CREAT BLD-MCNC: 0.61 MG/DL
DEPRECATED RDW RBC AUTO: 42.8 FL (ref 35.1–46.3)
EOSINOPHIL # BLD AUTO: 0.11 X10(3) UL (ref 0–0.7)
EOSINOPHIL NFR BLD AUTO: 1.6 %
ERYTHROCYTE [DISTWIDTH] IN BLOOD BY AUTOMATED COUNT: 12.6 % (ref 11–15)
FASTING PATIENT LIPID ANSWER: YES
FASTING STATUS PATIENT QL REPORTED: YES
GFR SERPLBLD BASED ON 1.73 SQ M-ARVRAT: 116 ML/MIN/1.73M2 (ref 60–?)
GLOBULIN PLAS-MCNC: 4.1 G/DL (ref 2.8–4.4)
GLUCOSE BLD-MCNC: 95 MG/DL (ref 70–99)
HCT VFR BLD AUTO: 43.6 %
HDLC SERPL-MCNC: 43 MG/DL (ref 40–59)
HGB BLD-MCNC: 14.2 G/DL
IMM GRANULOCYTES # BLD AUTO: 0.02 X10(3) UL (ref 0–1)
IMM GRANULOCYTES NFR BLD: 0.3 %
LDLC SERPL CALC-MCNC: 108 MG/DL (ref ?–100)
LYMPHOCYTES # BLD AUTO: 2.29 X10(3) UL (ref 1–4)
LYMPHOCYTES NFR BLD AUTO: 32.6 %
MCH RBC QN AUTO: 30.1 PG (ref 26–34)
MCHC RBC AUTO-ENTMCNC: 32.6 G/DL (ref 31–37)
MCV RBC AUTO: 92.6 FL
MONOCYTES # BLD AUTO: 0.54 X10(3) UL (ref 0.1–1)
MONOCYTES NFR BLD AUTO: 7.7 %
NEUTROPHILS # BLD AUTO: 4.02 X10 (3) UL (ref 1.5–7.7)
NEUTROPHILS # BLD AUTO: 4.02 X10(3) UL (ref 1.5–7.7)
NEUTROPHILS NFR BLD AUTO: 57.1 %
NONHDLC SERPL-MCNC: 142 MG/DL (ref ?–130)
OSMOLALITY SERPL CALC.SUM OF ELEC: 285 MOSM/KG (ref 275–295)
PLATELET # BLD AUTO: 368 10(3)UL (ref 150–450)
POTASSIUM SERPL-SCNC: 3.6 MMOL/L (ref 3.5–5.1)
PROT SERPL-MCNC: 7.6 G/DL (ref 6.4–8.2)
RBC # BLD AUTO: 4.71 X10(6)UL
SODIUM SERPL-SCNC: 138 MMOL/L (ref 136–145)
TRIGL SERPL-MCNC: 197 MG/DL (ref 30–149)
TSI SER-ACNC: 3.02 MIU/ML (ref 0.36–3.74)
VLDLC SERPL CALC-MCNC: 34 MG/DL (ref 0–30)
WBC # BLD AUTO: 7 X10(3) UL (ref 4–11)

## 2022-08-25 PROCEDURE — 3079F DIAST BP 80-89 MM HG: CPT | Performed by: INTERNAL MEDICINE

## 2022-08-25 PROCEDURE — 80061 LIPID PANEL: CPT

## 2022-08-25 PROCEDURE — 3008F BODY MASS INDEX DOCD: CPT | Performed by: INTERNAL MEDICINE

## 2022-08-25 PROCEDURE — 3074F SYST BP LT 130 MM HG: CPT | Performed by: INTERNAL MEDICINE

## 2022-08-25 PROCEDURE — 99396 PREV VISIT EST AGE 40-64: CPT | Performed by: INTERNAL MEDICINE

## 2022-08-25 PROCEDURE — 36415 COLL VENOUS BLD VENIPUNCTURE: CPT

## 2022-08-25 PROCEDURE — 84443 ASSAY THYROID STIM HORMONE: CPT

## 2022-08-25 PROCEDURE — 80053 COMPREHEN METABOLIC PANEL: CPT

## 2022-08-25 PROCEDURE — 85025 COMPLETE CBC W/AUTO DIFF WBC: CPT

## 2022-08-30 ENCOUNTER — HOSPITAL ENCOUNTER (OUTPATIENT)
Dept: ULTRASOUND IMAGING | Facility: HOSPITAL | Age: 42
Discharge: HOME | End: 2022-08-30
Attending: INTERNAL MEDICINE
Payer: COMMERCIAL

## 2022-08-30 ENCOUNTER — HOSPITAL ENCOUNTER (OUTPATIENT)
Dept: MAMMOGRAPHY | Facility: HOSPITAL | Age: 42
Discharge: HOME | End: 2022-08-30
Attending: INTERNAL MEDICINE
Payer: COMMERCIAL

## 2022-08-30 DIAGNOSIS — R92.8 ABNORMAL MAMMOGRAM: ICD-10-CM

## 2022-08-30 PROCEDURE — 77065 DX MAMMO INCL CAD UNI: CPT | Performed by: INTERNAL MEDICINE

## 2022-08-30 PROCEDURE — 77061 BREAST TOMOSYNTHESIS UNI: CPT | Performed by: INTERNAL MEDICINE

## 2022-08-30 PROCEDURE — 76642 ULTRASOUND BREAST LIMITED: CPT | Performed by: INTERNAL MEDICINE

## 2022-09-13 ENCOUNTER — OFFICE VISIT (OUTPATIENT)
Dept: OBGYN CLINIC | Facility: CLINIC | Age: 42
End: 2022-09-13
Payer: COMMERCIAL

## 2022-09-13 VITALS — SYSTOLIC BLOOD PRESSURE: 118 MMHG | WEIGHT: 224 LBS | DIASTOLIC BLOOD PRESSURE: 82 MMHG | BODY MASS INDEX: 38 KG/M2

## 2022-09-13 DIAGNOSIS — Z01.419 ENCOUNTER FOR GYNECOLOGICAL EXAMINATION WITHOUT ABNORMAL FINDING: Primary | ICD-10-CM

## 2022-09-13 PROCEDURE — 99396 PREV VISIT EST AGE 40-64: CPT | Performed by: OBSTETRICS & GYNECOLOGY

## 2022-09-13 PROCEDURE — 3074F SYST BP LT 130 MM HG: CPT | Performed by: OBSTETRICS & GYNECOLOGY

## 2022-09-13 PROCEDURE — 3079F DIAST BP 80-89 MM HG: CPT | Performed by: OBSTETRICS & GYNECOLOGY

## 2022-10-11 ENCOUNTER — PATIENT MESSAGE (OUTPATIENT)
Dept: INTERNAL MEDICINE CLINIC | Facility: CLINIC | Age: 42
End: 2022-10-11

## 2022-10-26 ENCOUNTER — IMMUNIZATION (OUTPATIENT)
Dept: FAMILY MEDICINE CLINIC | Facility: CLINIC | Age: 42
End: 2022-10-26
Payer: COMMERCIAL

## 2022-10-26 PROCEDURE — 90686 IIV4 VACC NO PRSV 0.5 ML IM: CPT | Performed by: NURSE PRACTITIONER

## 2022-10-26 PROCEDURE — 90471 IMMUNIZATION ADMIN: CPT | Performed by: NURSE PRACTITIONER

## 2023-01-22 ENCOUNTER — TELEPHONE (OUTPATIENT)
Dept: INTERNAL MEDICINE CLINIC | Facility: CLINIC | Age: 43
End: 2023-01-22

## 2023-01-22 DIAGNOSIS — R92.8 ABNORMAL MAMMOGRAM OF RIGHT BREAST: Primary | ICD-10-CM

## 2023-03-17 ENCOUNTER — OFFICE VISIT (OUTPATIENT)
Dept: FAMILY MEDICINE CLINIC | Facility: CLINIC | Age: 43
End: 2023-03-17
Payer: COMMERCIAL

## 2023-03-17 VITALS
TEMPERATURE: 99 F | BODY MASS INDEX: 38 KG/M2 | SYSTOLIC BLOOD PRESSURE: 140 MMHG | HEIGHT: 64 IN | HEART RATE: 81 BPM | OXYGEN SATURATION: 97 % | DIASTOLIC BLOOD PRESSURE: 80 MMHG | RESPIRATION RATE: 18 BRPM

## 2023-03-17 DIAGNOSIS — J02.0 STREP PHARYNGITIS: Primary | ICD-10-CM

## 2023-03-17 LAB
CONTROL LINE PRESENT WITH A CLEAR BACKGROUND (YES/NO): YES YES/NO
KIT LOT #: ABNORMAL NUMERIC
STREP GRP A CUL-SCR: POSITIVE

## 2023-03-17 PROCEDURE — 87880 STREP A ASSAY W/OPTIC: CPT | Performed by: PHYSICIAN ASSISTANT

## 2023-03-17 PROCEDURE — 3077F SYST BP >= 140 MM HG: CPT | Performed by: PHYSICIAN ASSISTANT

## 2023-03-17 PROCEDURE — 99213 OFFICE O/P EST LOW 20 MIN: CPT | Performed by: PHYSICIAN ASSISTANT

## 2023-03-17 PROCEDURE — 3079F DIAST BP 80-89 MM HG: CPT | Performed by: PHYSICIAN ASSISTANT

## 2023-03-17 RX ORDER — CLINDAMYCIN HYDROCHLORIDE 300 MG/1
300 CAPSULE ORAL 3 TIMES DAILY
Qty: 30 CAPSULE | Refills: 0 | Status: SHIPPED | OUTPATIENT
Start: 2023-03-17 | End: 2023-03-27

## 2023-05-03 ENCOUNTER — HOSPITAL ENCOUNTER (OUTPATIENT)
Dept: MAMMOGRAPHY | Facility: HOSPITAL | Age: 43
Discharge: HOME | End: 2023-05-03
Attending: INTERNAL MEDICINE
Payer: COMMERCIAL

## 2023-05-03 DIAGNOSIS — R92.8 ABNORMAL MAMMOGRAM OF RIGHT BREAST: ICD-10-CM

## 2023-05-03 PROCEDURE — 77061 BREAST TOMOSYNTHESIS UNI: CPT | Performed by: INTERNAL MEDICINE

## 2023-05-03 PROCEDURE — 77065 DX MAMMO INCL CAD UNI: CPT | Performed by: INTERNAL MEDICINE

## 2023-05-07 ENCOUNTER — TELEPHONE (OUTPATIENT)
Dept: INTERNAL MEDICINE CLINIC | Facility: CLINIC | Age: 43
End: 2023-05-07

## 2023-05-07 DIAGNOSIS — Z12.31 ENCOUNTER FOR SCREENING MAMMOGRAM FOR MALIGNANT NEOPLASM OF BREAST: Primary | ICD-10-CM

## 2023-08-24 ENCOUNTER — OFFICE VISIT (OUTPATIENT)
Dept: INTERNAL MEDICINE CLINIC | Facility: CLINIC | Age: 43
End: 2023-08-24

## 2023-08-24 ENCOUNTER — LAB ENCOUNTER (OUTPATIENT)
Dept: LAB | Age: 43
End: 2023-08-24
Attending: INTERNAL MEDICINE
Payer: COMMERCIAL

## 2023-08-24 VITALS
WEIGHT: 221.63 LBS | SYSTOLIC BLOOD PRESSURE: 134 MMHG | HEART RATE: 61 BPM | BODY MASS INDEX: 37.84 KG/M2 | TEMPERATURE: 97 F | OXYGEN SATURATION: 96 % | DIASTOLIC BLOOD PRESSURE: 84 MMHG | HEIGHT: 64 IN

## 2023-08-24 DIAGNOSIS — Z12.31 ENCOUNTER FOR SCREENING MAMMOGRAM FOR MALIGNANT NEOPLASM OF BREAST: ICD-10-CM

## 2023-08-24 DIAGNOSIS — Z00.00 ANNUAL PHYSICAL EXAM: ICD-10-CM

## 2023-08-24 DIAGNOSIS — Z01.419 WELL FEMALE EXAM WITH ROUTINE GYNECOLOGICAL EXAM: ICD-10-CM

## 2023-08-24 DIAGNOSIS — R03.0 ELEVATED BLOOD PRESSURE READING: ICD-10-CM

## 2023-08-24 DIAGNOSIS — E78.5 DYSLIPIDEMIA: ICD-10-CM

## 2023-08-24 DIAGNOSIS — Z00.00 ANNUAL PHYSICAL EXAM: Primary | ICD-10-CM

## 2023-08-24 LAB
ALBUMIN SERPL-MCNC: 3.7 G/DL (ref 3.4–5)
ALBUMIN/GLOB SERPL: 1 {RATIO} (ref 1–2)
ALP LIVER SERPL-CCNC: 75 U/L
ALT SERPL-CCNC: 26 U/L
ANION GAP SERPL CALC-SCNC: 4 MMOL/L (ref 0–18)
AST SERPL-CCNC: 11 U/L (ref 15–37)
BASOPHILS # BLD AUTO: 0.06 X10(3) UL (ref 0–0.2)
BASOPHILS NFR BLD AUTO: 0.9 %
BILIRUB SERPL-MCNC: 0.4 MG/DL (ref 0.1–2)
BUN BLD-MCNC: 9 MG/DL (ref 7–18)
CALCIUM BLD-MCNC: 8.9 MG/DL (ref 8.5–10.1)
CHLORIDE SERPL-SCNC: 108 MMOL/L (ref 98–112)
CHOLEST SERPL-MCNC: 188 MG/DL (ref ?–200)
CO2 SERPL-SCNC: 27 MMOL/L (ref 21–32)
CREAT BLD-MCNC: 0.74 MG/DL
EGFRCR SERPLBLD CKD-EPI 2021: 104 ML/MIN/1.73M2 (ref 60–?)
EOSINOPHIL # BLD AUTO: 0.21 X10(3) UL (ref 0–0.7)
EOSINOPHIL NFR BLD AUTO: 3.3 %
ERYTHROCYTE [DISTWIDTH] IN BLOOD BY AUTOMATED COUNT: 12.3 %
FASTING PATIENT LIPID ANSWER: YES
FASTING STATUS PATIENT QL REPORTED: YES
GLOBULIN PLAS-MCNC: 3.8 G/DL (ref 2.8–4.4)
GLUCOSE BLD-MCNC: 108 MG/DL (ref 70–99)
HCT VFR BLD AUTO: 41.7 %
HDLC SERPL-MCNC: 40 MG/DL (ref 40–59)
HGB BLD-MCNC: 14 G/DL
IMM GRANULOCYTES # BLD AUTO: 0.01 X10(3) UL (ref 0–1)
IMM GRANULOCYTES NFR BLD: 0.2 %
LDLC SERPL CALC-MCNC: 113 MG/DL (ref ?–100)
LYMPHOCYTES # BLD AUTO: 2.37 X10(3) UL (ref 1–4)
LYMPHOCYTES NFR BLD AUTO: 36.9 %
MCH RBC QN AUTO: 30.4 PG (ref 26–34)
MCHC RBC AUTO-ENTMCNC: 33.6 G/DL (ref 31–37)
MCV RBC AUTO: 90.5 FL
MONOCYTES # BLD AUTO: 0.39 X10(3) UL (ref 0.1–1)
MONOCYTES NFR BLD AUTO: 6.1 %
NEUTROPHILS # BLD AUTO: 3.39 X10 (3) UL (ref 1.5–7.7)
NEUTROPHILS # BLD AUTO: 3.39 X10(3) UL (ref 1.5–7.7)
NEUTROPHILS NFR BLD AUTO: 52.6 %
NONHDLC SERPL-MCNC: 148 MG/DL (ref ?–130)
OSMOLALITY SERPL CALC.SUM OF ELEC: 287 MOSM/KG (ref 275–295)
PLATELET # BLD AUTO: 404 10(3)UL (ref 150–450)
POTASSIUM SERPL-SCNC: 3.7 MMOL/L (ref 3.5–5.1)
PROT SERPL-MCNC: 7.5 G/DL (ref 6.4–8.2)
RBC # BLD AUTO: 4.61 X10(6)UL
SODIUM SERPL-SCNC: 139 MMOL/L (ref 136–145)
TRIGL SERPL-MCNC: 198 MG/DL (ref 30–149)
TSI SER-ACNC: 3.2 MIU/ML (ref 0.36–3.74)
VLDLC SERPL CALC-MCNC: 34 MG/DL (ref 0–30)
WBC # BLD AUTO: 6.4 X10(3) UL (ref 4–11)

## 2023-08-24 PROCEDURE — 83036 HEMOGLOBIN GLYCOSYLATED A1C: CPT

## 2023-08-24 PROCEDURE — 36415 COLL VENOUS BLD VENIPUNCTURE: CPT

## 2023-08-24 PROCEDURE — 85025 COMPLETE CBC W/AUTO DIFF WBC: CPT

## 2023-08-24 PROCEDURE — 80053 COMPREHEN METABOLIC PANEL: CPT

## 2023-08-24 PROCEDURE — 3079F DIAST BP 80-89 MM HG: CPT | Performed by: INTERNAL MEDICINE

## 2023-08-24 PROCEDURE — 3075F SYST BP GE 130 - 139MM HG: CPT | Performed by: INTERNAL MEDICINE

## 2023-08-24 PROCEDURE — 99396 PREV VISIT EST AGE 40-64: CPT | Performed by: INTERNAL MEDICINE

## 2023-08-24 PROCEDURE — 84443 ASSAY THYROID STIM HORMONE: CPT

## 2023-08-24 PROCEDURE — 80061 LIPID PANEL: CPT

## 2023-08-24 PROCEDURE — 3008F BODY MASS INDEX DOCD: CPT | Performed by: INTERNAL MEDICINE

## 2023-08-24 NOTE — PROGRESS NOTES
Subjective:   Patient ID: Crystal Gómez is a 41year old female. Patient presents today for her annual physical.         History/Other:   Review of Systems   Constitutional: Negative. HENT: Negative. Eyes: Negative. Respiratory: Negative. Cardiovascular: Negative. Negative for chest pain and palpitations. No pnd   Gastrointestinal: Negative. Genitourinary: Negative. Allergic/Immunologic: Positive for environmental allergies and food allergies. Negative for immunocompromised state. Neurological:  Negative for syncope. Hematological: Negative. Current Outpatient Medications   Medication Sig Dispense Refill    Ascorbic Acid (VITAMIN C) 1000 MG Oral Tab Take 1 tablet (1,000 mg total) by mouth daily. zinc Sulfate 50 MG Oral Cap Take 50 mg by mouth daily. COD LIVER OIL OR Take 2 capsules by mouth. Cholecalciferol (VITAMIN D-3 OR) Take 1 capsule by mouth daily. Multiple Vitamins-Minerals (CENTRUM) Oral Tab Take 1 tablet by mouth daily. Glucosamine-Chondroit-Calcium (TRIPLE FLEX BONE & JOINT OR) Take 1 tablet by mouth daily. (Patient not taking: Reported on 8/24/2023)      COVID-19 Specimen Collection Does not apply Kit TEST AS DIRECTED TODAY (Patient not taking: Reported on 9/13/2022)       Allergies:  Amoxicillin-Pot Cla*    HIVES    Comment:Only happened once    Objective:   Physical Exam  Constitutional:       General: She is not in acute distress. Appearance: She is well-developed. She is obese. She is not ill-appearing, toxic-appearing or diaphoretic. HENT:      Head: Normocephalic and atraumatic. Right Ear: Tympanic membrane, ear canal and external ear normal.      Left Ear: Tympanic membrane, ear canal and external ear normal.      Nose: Nose normal.      Mouth/Throat:      Pharynx: No oropharyngeal exudate. Eyes:      General:         Right eye: No discharge. Left eye: No discharge.       Conjunctiva/sclera: Conjunctivae normal.      Pupils: Pupils are equal, round, and reactive to light. Neck:      Vascular: No JVD. Cardiovascular:      Rate and Rhythm: Normal rate and regular rhythm. Pulses: Normal pulses. Heart sounds: Normal heart sounds. No murmur heard. Pulmonary:      Effort: Pulmonary effort is normal. No respiratory distress. Breath sounds: Normal breath sounds. No wheezing or rales. Abdominal:      General: Bowel sounds are normal. There is no distension. Palpations: Abdomen is soft. There is no mass. Tenderness: There is no abdominal tenderness. There is no guarding or rebound. Musculoskeletal:         General: No tenderness. Normal range of motion. Cervical back: Normal range of motion and neck supple. No rigidity or tenderness. Right lower leg: No edema. Left lower leg: No edema. Lymphadenopathy:      Cervical: No cervical adenopathy. Skin:     General: Skin is warm and dry. Findings: No rash. Neurological:      Mental Status: She is alert and oriented to person, place, and time. Assessment & Plan:   (Z00.00) Annual physical exam  (primary encounter diagnosis)  Plan: CBC WITH DIFFERENTIAL WITH PLATELET, COMP         METABOLIC PANEL (14), LIPID PANEL, TSH W REFLEX        TO FREE T4        Routine labs ordered. Advised to get flushot this fall.     (E78.5) Dyslipidemia  Plan: check lipid panel ff low fat low cho ldiet .    (Z68.38) BMI 38.0-38.9,adult  Plan: d/w pt importance of losing weight; she had done ketodiet before whch had  helped so I told her to go on diet, avoid carbs.      (Z12.31) Encounter for screening mammogram for malignant neoplasm of breast  Plan: pt reminded she is due for her mammogram this month as per recommendation of radiologst.     (Z01.419) Well female exam with routine gynecological exam      (R03.0) Elevated blood pressure reading  Plan: bp elevated based on current guidelines and we discussed treatment; we agreed will do lifestyle modification treatment for now, working on losing weight; we will see her back in 3mos for recheck bp .  '      Orders Placed This Encounter      CBC With Differential With Platelet      Comp Metabolic Panel (14)      Lipid Panel      TSH W Reflex To Free T4      Meds This Visit:  Requested Prescriptions      No prescriptions requested or ordered in this encounter       Imaging & Referrals:  [facility] - INTERNAL

## 2023-08-25 ENCOUNTER — TELEPHONE (OUTPATIENT)
Dept: INTERNAL MEDICINE CLINIC | Facility: CLINIC | Age: 43
End: 2023-08-25

## 2023-08-25 DIAGNOSIS — R92.8 ABNORMAL MAMMOGRAM: Primary | ICD-10-CM

## 2023-08-25 NOTE — TELEPHONE ENCOUNTER
Pls refer to pt's last mammogram report as to the timing and the mammogram needed then clarify with radiology.     Last diagnostic right mammogram done on 5/3/23, radiologist in his recommendation states :  Short term ffup diagnostic right breast mammogram in 6mos which would be Nov 2023  Annual left mammogram in Aug 2023

## 2023-08-25 NOTE — TELEPHONE ENCOUNTER
Called mammography department & left message:  Pls refer to pt's last mammogram report as to the timing and the mammogram needed then clarify with radiology. Last diagnostic right mammogram done on 5/3/23, radiologist in his recommendation states :  Short term ffup diagnostic right breast mammogram in 6mos which would be Nov 2023  Annual left mammogram in Aug 2023     Noted on final completed 8/30/22  RECOMMENDATIONS:   SHORT TERM FOLLOW-UP DIAGNOSTIC MAMMOGRAM RIGHT BREAST IN 6 MONTHS.    (Should of had done in 2/23 ) unilateral mammogram

## 2023-08-25 NOTE — TELEPHONE ENCOUNTER
Mammography called back and want an order to do bilateral diagnostic with US if needed for October, that way all recommended follow ups will be met.   Please order diagnostic bilateral

## 2023-08-25 NOTE — TELEPHONE ENCOUNTER
Orders from 5/23 are separate diagnostic for right and left breast.  Should the order be for bilateral  Please order bilateral diagnostic if appropriate

## 2023-08-25 NOTE — TELEPHONE ENCOUNTER
Called requesting for a new bilateral diagnostic abnormal mammogram both breast with ultrasound patient can wait another month since follow up was supposed to be last month

## 2023-08-26 ENCOUNTER — TELEPHONE (OUTPATIENT)
Dept: INTERNAL MEDICINE CLINIC | Facility: CLINIC | Age: 43
End: 2023-08-26

## 2023-08-26 DIAGNOSIS — Z00.00 ANNUAL PHYSICAL EXAM: Primary | ICD-10-CM

## 2023-08-26 LAB
EST. AVERAGE GLUCOSE BLD GHB EST-MCNC: 123 MG/DL (ref 68–126)
HBA1C MFR BLD: 5.9 % (ref ?–5.7)

## 2023-09-15 ENCOUNTER — HOSPITAL ENCOUNTER (OUTPATIENT)
Dept: MAMMOGRAPHY | Facility: HOSPITAL | Age: 43
Discharge: HOME | End: 2023-09-15
Attending: INTERNAL MEDICINE
Payer: COMMERCIAL

## 2023-09-15 DIAGNOSIS — R92.8 ABNORMAL MAMMOGRAM: ICD-10-CM

## 2023-09-15 PROCEDURE — 77066 DX MAMMO INCL CAD BI: CPT | Performed by: INTERNAL MEDICINE

## 2023-09-15 PROCEDURE — 77062 BREAST TOMOSYNTHESIS BI: CPT | Performed by: INTERNAL MEDICINE

## 2023-09-22 ENCOUNTER — OFFICE VISIT (OUTPATIENT)
Dept: OBGYN CLINIC | Facility: CLINIC | Age: 43
End: 2023-09-22

## 2023-09-22 VITALS
WEIGHT: 217 LBS | SYSTOLIC BLOOD PRESSURE: 118 MMHG | BODY MASS INDEX: 37.05 KG/M2 | DIASTOLIC BLOOD PRESSURE: 76 MMHG | HEIGHT: 64 IN

## 2023-09-22 DIAGNOSIS — R92.8 ABNORMAL MAMMOGRAM: ICD-10-CM

## 2023-09-22 DIAGNOSIS — Z01.419 WELL WOMAN EXAM WITH ROUTINE GYNECOLOGICAL EXAM: Primary | ICD-10-CM

## 2023-09-22 DIAGNOSIS — Z12.31 ENCOUNTER FOR SCREENING MAMMOGRAM FOR MALIGNANT NEOPLASM OF BREAST: ICD-10-CM

## 2023-09-22 PROCEDURE — 99396 PREV VISIT EST AGE 40-64: CPT | Performed by: OBSTETRICS & GYNECOLOGY

## 2023-09-22 PROCEDURE — 3074F SYST BP LT 130 MM HG: CPT | Performed by: OBSTETRICS & GYNECOLOGY

## 2023-09-22 PROCEDURE — 3008F BODY MASS INDEX DOCD: CPT | Performed by: OBSTETRICS & GYNECOLOGY

## 2023-09-22 PROCEDURE — 3078F DIAST BP <80 MM HG: CPT | Performed by: OBSTETRICS & GYNECOLOGY

## 2023-09-27 LAB — HPV I/H RISK 1 DNA SPEC QL NAA+PROBE: NEGATIVE

## 2023-12-20 ENCOUNTER — NURSE TRIAGE (OUTPATIENT)
Dept: INTERNAL MEDICINE CLINIC | Facility: CLINIC | Age: 43
End: 2023-12-20

## 2023-12-20 RX ORDER — FLUTICASONE PROPIONATE 110 UG/1
2 AEROSOL, METERED RESPIRATORY (INHALATION) 2 TIMES DAILY
Qty: 1 EACH | Refills: 0 | Status: CANCELLED | OUTPATIENT
Start: 2023-12-20

## 2023-12-20 NOTE — TELEPHONE ENCOUNTER
Action Requested: Summary for Provider     []  Critical Lab, Recommendations Needed  [x] Need Additional Advice  []   FYI    []   Need Orders  [x] Need Medications Sent to Pharmacy  []  Other     SUMMARY: home care. Patient asking if Dr. Pola Scott can send in a refill for her Flovent. States this has helped her in the past. Medication pended for your review and approval.     Reason for call: Cough  Onset: 3 weeks     Patient calling (identified name and ) states she had the flu 3 weeks ago and continues to have a lingering dry cough. Denies any shortness of breath, chest pain, congestion, fever or any other symptoms. Cough has been waking her up at night.     Patient states if she needs to be seen, only wants to be seen by Dr. Pola Scott.    Reason for Disposition   Cough with no complications    Protocols used: Cough-A-OH

## 2023-12-20 NOTE — TELEPHONE ENCOUNTER
Spoke with patient, ( Name and   verified ) informed of   Dr. Jazmin Rodriguez instructions    Patient will be at the appointment       Mirna Marquez RN  will open the schedule and book the appointment

## 2023-12-20 NOTE — TELEPHONE ENCOUNTER
Appointment scheduled.   Future Appointments   Date Time Provider Chinmay Almonte   12/21/2023  8:30 AM MD AMBER LucioO

## 2023-12-21 ENCOUNTER — OFFICE VISIT (OUTPATIENT)
Dept: INTERNAL MEDICINE CLINIC | Facility: CLINIC | Age: 43
End: 2023-12-21
Payer: COMMERCIAL

## 2023-12-21 ENCOUNTER — TELEPHONE (OUTPATIENT)
Dept: INTERNAL MEDICINE CLINIC | Facility: CLINIC | Age: 43
End: 2023-12-21

## 2023-12-21 VITALS
DIASTOLIC BLOOD PRESSURE: 80 MMHG | TEMPERATURE: 98 F | HEART RATE: 84 BPM | BODY MASS INDEX: 36.47 KG/M2 | WEIGHT: 213.63 LBS | SYSTOLIC BLOOD PRESSURE: 124 MMHG | HEIGHT: 64 IN | OXYGEN SATURATION: 98 %

## 2023-12-21 DIAGNOSIS — R05.1 ACUTE COUGH: Primary | ICD-10-CM

## 2023-12-21 PROCEDURE — 3079F DIAST BP 80-89 MM HG: CPT | Performed by: INTERNAL MEDICINE

## 2023-12-21 PROCEDURE — 3008F BODY MASS INDEX DOCD: CPT | Performed by: INTERNAL MEDICINE

## 2023-12-21 PROCEDURE — 99213 OFFICE O/P EST LOW 20 MIN: CPT | Performed by: INTERNAL MEDICINE

## 2023-12-21 PROCEDURE — 3074F SYST BP LT 130 MM HG: CPT | Performed by: INTERNAL MEDICINE

## 2023-12-21 RX ORDER — FLUTICASONE PROPIONATE 110 UG/1
2 AEROSOL, METERED RESPIRATORY (INHALATION) 2 TIMES DAILY
Qty: 1 EACH | Refills: 0 | Status: SHIPPED | OUTPATIENT
Start: 2023-12-21 | End: 2024-12-15

## 2023-12-21 RX ORDER — CODEINE PHOSPHATE AND GUAIFENESIN 10; 100 MG/5ML; MG/5ML
5 SOLUTION ORAL EVERY 6 HOURS PRN
Qty: 120 ML | Refills: 0 | Status: SHIPPED | OUTPATIENT
Start: 2023-12-21

## 2023-12-21 RX ORDER — FLUTICASONE PROPIONATE AND SALMETEROL XINAFOATE 115; 21 UG/1; UG/1
2 AEROSOL, METERED RESPIRATORY (INHALATION) 2 TIMES DAILY
Qty: 1 EACH | Refills: 0 | Status: SHIPPED | OUTPATIENT
Start: 2023-12-21

## 2023-12-21 NOTE — TELEPHONE ENCOUNTER
Pt states that her insurance does not cover the Flovent. Pt states that there insurance prefers arnuity or asmanex medication. Would the doctor like to change the medication? Pharmacy: [facility]/[place], [place] (listed)     Current Outpatient Medications   Medication Sig Dispense Refill    fluticasone propionate (FLOVENT HFA) 110 MCG/ACT Inhalation Aerosol Inhale 2 puffs into the lungs 2 (two) times daily.  1 each 0

## 2023-12-21 NOTE — TELEPHONE ENCOUNTER
Current Outpatient Medications:     fluticasone propionate (FLOVENT HFA) 110 MCG/ACT Inhalation Aerosol, Inhale 2 puffs into the lungs 2 (two) times daily., Disp: 1 each, Rfl: 0    Message: Drug not covered by Insurance.

## 2023-12-21 NOTE — PROGRESS NOTES
Subjective:     Patient ID: Lucia Barraza is a 41year old female. Cough  This is a new problem. The current episode started 1 to 4 weeks ago (3 weeis). The problem has been unchanged. The problem occurs every few hours. The cough is Non-productive. Associated symptoms include a fever. Pertinent negatives include no hemoptysis, myalgias, nasal congestion, postnasal drip, rhinorrhea, sore throat, shortness of breath or wheezing. Associated symptoms comments: Initially had fever but had resolved. Nothing aggravates the symptoms. Risk factors: other family members had similar symptoms past weeks,  didnt test for covid or flu. She has tried OTC cough suppressant for the symptoms. There is no history of asthma, emphysema or pneumonia. History/Other:   Review of Systems   Constitutional:  Positive for fever. HENT:  Negative for postnasal drip, rhinorrhea and sore throat. Respiratory:  Positive for cough. Negative for hemoptysis, shortness of breath and wheezing. Musculoskeletal:  Negative for myalgias. Current Outpatient Medications   Medication Sig Dispense Refill    Glucosamine-Chondroit-Calcium (TRIPLE FLEX BONE & JOINT OR) Take 1 tablet by mouth daily. Ascorbic Acid (VITAMIN C) 1000 MG Oral Tab Take 1 tablet (1,000 mg total) by mouth daily. zinc Sulfate 50 MG Oral Cap Take 50 mg by mouth daily. COD LIVER OIL OR Take 2 capsules by mouth. Cholecalciferol (VITAMIN D-3 OR) Take 1 capsule by mouth daily. Multiple Vitamins-Minerals (CENTRUM) Oral Tab Take 1 tablet by mouth daily. COVID-19 Specimen Collection Does not apply Kit TEST AS DIRECTED TODAY (Patient not taking: Reported on 9/13/2022)       Allergies:   Allergies   Allergen Reactions    Amoxicillin-Pot Clavulanate HIVES     Only happened once       Past Medical History:   Diagnosis Date    DM (diabetes mellitus), gestational     Dyslipidemia 08/25/2022      Past Surgical History:   Procedure Laterality Date         Family History   Problem Relation Age of Onset    Other (Other) Father         emphysema and glaucoma    Other (Other) Mother         ostoearthritis    No Known Problems Brother     Breast Cancer Neg     Ovarian Cancer Neg     Colon Cancer Neg     Uterine Cancer Neg       Social History:   Social History     Socioeconomic History    Marital status:    Tobacco Use    Smoking status: Former     Types: Cigarettes     Quit date: 2014     Years since quittin.5    Smokeless tobacco: Never    Tobacco comments:     Quit cold turkey   Vaping Use    Vaping Use: Never used   Substance and Sexual Activity    Alcohol use: Not Currently    Drug use: Never        Objective:   Physical Exam  Constitutional:       General: She is not in acute distress. Appearance: She is well-developed. She is not ill-appearing, toxic-appearing or diaphoretic. HENT:      Head: Normocephalic and atraumatic. Right Ear: External ear normal.      Left Ear: External ear normal.      Nose: Nose normal.      Mouth/Throat:      Pharynx: No oropharyngeal exudate. Eyes:      General:         Right eye: No discharge. Left eye: No discharge. Conjunctiva/sclera: Conjunctivae normal.      Pupils: Pupils are equal, round, and reactive to light. Neck:      Vascular: No JVD. Cardiovascular:      Rate and Rhythm: Normal rate and regular rhythm. Heart sounds: Normal heart sounds. No murmur heard. Pulmonary:      Effort: Pulmonary effort is normal. No respiratory distress. Breath sounds: Normal breath sounds. No wheezing or rales. Abdominal:      General: Bowel sounds are normal. There is no distension. Palpations: Abdomen is soft. There is no mass. Tenderness: There is no abdominal tenderness. There is no guarding or rebound. Musculoskeletal:         General: No tenderness. Normal range of motion. Cervical back: Normal range of motion and neck supple.  No rigidity or tenderness. Right lower leg: No edema. Left lower leg: No edema. Lymphadenopathy:      Cervical: No cervical adenopathy. Skin:     General: Skin is warm and dry. Findings: No rash. Neurological:      Mental Status: She is alert and oriented to person, place, and time. Assessment & Plan:   (R05.1) Acute cough  (primary encounter diagnosis)  Plan: d/w pt suspect viral bronchitis already going on for 3 weeks and likely having postinfectious cough that I told her can last few  weeks. Will give her flovent inhaler and also gave her cheratussin ac (cautioned about drowsiness/pt also denied being pregnant). Call back if symptoms persist/worsens. No orders of the defined types were placed in this encounter.       Meds This Visit:  Requested Prescriptions      No prescriptions requested or ordered in this encounter       Imaging & Referrals:  None

## 2023-12-23 NOTE — TELEPHONE ENCOUNTER
Patient calling to inform that she was told by pharmacy that they need prior authorization for the new prescription Advair

## 2024-03-08 ENCOUNTER — HOSPITAL ENCOUNTER (OUTPATIENT)
Dept: MAMMOGRAPHY | Facility: HOSPITAL | Age: 44
Discharge: HOME | End: 2024-03-08
Attending: OBSTETRICS & GYNECOLOGY
Payer: COMMERCIAL

## 2024-03-08 DIAGNOSIS — R92.8 ABNORMAL MAMMOGRAM: ICD-10-CM

## 2024-03-08 PROCEDURE — 77061 BREAST TOMOSYNTHESIS UNI: CPT | Performed by: OBSTETRICS & GYNECOLOGY

## 2024-03-08 PROCEDURE — 77065 DX MAMMO INCL CAD UNI: CPT | Performed by: OBSTETRICS & GYNECOLOGY

## 2024-03-12 ENCOUNTER — TELEPHONE (OUTPATIENT)
Dept: OBGYN CLINIC | Facility: CLINIC | Age: 44
End: 2024-03-12

## 2024-03-12 DIAGNOSIS — N64.89 BREAST ASYMMETRY: Primary | ICD-10-CM

## 2024-03-13 ENCOUNTER — TELEPHONE (OUTPATIENT)
Dept: OBGYN CLINIC | Facility: CLINIC | Age: 44
End: 2024-03-13

## 2024-03-13 NOTE — TELEPHONE ENCOUNTER
----- Message from Jose Grimes MD sent at 3/12/2024  1:19 AM CDT -----  Impression  CONCLUSION:       Probably benign asymmetry in the outer right breast, which has been stable since August 2022.     Recommend follow-up diagnostic right breast mammogram in 6 months and possible right breast ultrasound to monitor for stability.  At that time, the patient will be due for a screening left breast mammogram.     BI-RADS CATEGORY:    DIAGNOSTIC CATEGORY 3--PROBABLY BENIGN FINDING.       RECOMMENDATIONS:  SHORT TERM FOLLOW-UP DIAGNOSTIC MAMMOGRAM BILATERAL BREASTS IN 6 MONTHS.       Please inform,  order for f/u diagnostic mammogram in 6 months being placed now. Help pt make this appt

## 2024-03-13 NOTE — TELEPHONE ENCOUNTER
Pt Name and  verified.  Patient informed and verbalized understanding.  Pt states that she will call to schedule.

## 2024-08-05 ENCOUNTER — TELEPHONE (OUTPATIENT)
Dept: OBGYN CLINIC | Facility: CLINIC | Age: 44
End: 2024-08-05

## 2024-08-23 ENCOUNTER — OFFICE VISIT (OUTPATIENT)
Dept: OBGYN CLINIC | Facility: CLINIC | Age: 44
End: 2024-08-23
Payer: COMMERCIAL

## 2024-08-23 VITALS
DIASTOLIC BLOOD PRESSURE: 84 MMHG | HEIGHT: 64 IN | WEIGHT: 234 LBS | BODY MASS INDEX: 39.95 KG/M2 | SYSTOLIC BLOOD PRESSURE: 136 MMHG

## 2024-08-23 DIAGNOSIS — Z01.419 WELL WOMAN EXAM WITH ROUTINE GYNECOLOGICAL EXAM: Primary | ICD-10-CM

## 2024-08-23 PROCEDURE — 88175 CYTOPATH C/V AUTO FLUID REDO: CPT | Performed by: OBSTETRICS & GYNECOLOGY

## 2024-08-23 PROCEDURE — 87624 HPV HI-RISK TYP POOLED RSLT: CPT | Performed by: OBSTETRICS & GYNECOLOGY

## 2024-08-23 NOTE — PROGRESS NOTES
HPI:   Mayra Del Cid is a 44 year old female who presents for an annual/pap.     Wt Readings from Last 6 Encounters:   08/23/24 234 lb (106.1 kg)   12/21/23 213 lb 9.6 oz (96.9 kg)   09/22/23 217 lb (98.4 kg)   08/24/23 221 lb 9.6 oz (100.5 kg)   09/13/22 224 lb (101.6 kg)   08/25/22 222 lb 14.4 oz (101.1 kg)     Body mass index is 40.17 kg/m².    Cholesterol, Total (mg/dL)   Date Value   08/24/2023 188   08/25/2022 185   08/03/2021 177     HDL Cholesterol (mg/dL)   Date Value   08/24/2023 40   08/25/2022 43   08/03/2021 39 (L)     LDL Cholesterol (mg/dL)   Date Value   08/24/2023 113 (H)   08/25/2022 108 (H)   08/03/2021 121 (H)     AST (U/L)   Date Value   08/24/2023 11 (L)   08/25/2022 19   08/03/2021 12 (L)     ALT (U/L)   Date Value   08/24/2023 26   08/25/2022 35   08/03/2021 20        Current Outpatient Medications   Medication Sig Dispense Refill    Glucosamine-Chondroit-Calcium (TRIPLE FLEX BONE & JOINT OR) Take 1 tablet by mouth daily.      COVID-19 Specimen Collection Does not apply Kit       Ascorbic Acid (VITAMIN C) 1000 MG Oral Tab Take 1 tablet (1,000 mg total) by mouth daily.      zinc Sulfate 50 MG Oral Cap Take 50 mg by mouth daily.      COD LIVER OIL OR Take 2 capsules by mouth.      Cholecalciferol (VITAMIN D-3 OR) Take 1 capsule by mouth daily.      Multiple Vitamins-Minerals (CENTRUM) Oral Tab Take 1 tablet by mouth daily.      Mometasone Furo-Formoterol Fum (DULERA) 100-5 MCG/ACT Inhalation Aerosol Inhale 2 puffs into the lungs in the morning and 2 puffs before bedtime. (Patient not taking: Reported on 8/23/2024) 1 each 0    fluticasone propionate (FLOVENT HFA) 110 MCG/ACT Inhalation Aerosol Inhale 2 puffs into the lungs 2 (two) times daily. (Patient not taking: Reported on 8/23/2024) 1 each 0    guaiFENesin-codeine (CHERATUSSIN AC) 100-10 MG/5ML Oral Solution Take 5 mL by mouth every 6 (six) hours as needed for cough. (Patient not taking: Reported on 8/23/2024) 120 mL 0      Past  Medical History:    DM (diabetes mellitus), gestational (HCC)    Dyslipidemia      Past Surgical History:   Procedure Laterality Date            Family History   Problem Relation Age of Onset    Other (Other) Father         emphysema and glaucoma    Other (Other) Mother         ostoearthritis    No Known Problems Brother     Breast Cancer Neg     Ovarian Cancer Neg     Colon Cancer Neg     Uterine Cancer Neg       Social History:   Social History     Socioeconomic History    Marital status:    Tobacco Use    Smoking status: Former     Current packs/day: 0.00     Types: Cigarettes     Quit date: 2014     Years since quitting: 10.1    Smokeless tobacco: Never    Tobacco comments:     Quit cold turkey   Vaping Use    Vaping status: Never Used   Substance and Sexual Activity    Alcohol use: Not Currently    Drug use: Never            REVIEW OF SYSTEMS:   GENERAL: feels well otherwise  SKIN: denies any unusual skin lesions  EYES:denies blurred vision or double vision  HEENT: denies nasal congestion, sinus pain or ST  LUNGS: denies shortness of breath with exertion  CARDIOVASCULAR: denies chest pain on exertion  GI: denies abdominal pain,denies heartburn  : denies dysuria, vaginal discharge or itching,periods regular   MUSCULOSKELETAL: denies back pain  NEURO: denies headaches  PSYCHE: denies depression or anxiety  HEMATOLOGIC: denies hx of anemia  ENDOCRINE: denies thyroid history  ALL/ASTHMA: denies hx of allergy or asthma    EXAM:   BP (!) 156/95 (BP Location: Left arm, Patient Position: Sitting, Cuff Size: large)   Ht 5' 4\" (1.626 m)   Wt 234 lb (106.1 kg)   LMP 2024 (Exact Date)   BMI 40.17 kg/m²   Body mass index is 40.17 kg/m².   GENERAL: well developed, well nourished,in no apparent distress  SKIN: no rashes,no suspicious lesions  HEENT: atraumatic, normocephalic  EYES:normal in appearance  NECK: supple,no adenopathy  CHEST: no chest tenderness  BREAST: no dominant or suspicious  mass  LUNGS: clear to auscultation  CARDIO: RRR without murmur  GI: good BS's,no masses, HSM or tenderness  :introitus is normal,scant discharge,cervix is pink,no adnexal masses or tenderness, PAP was done     MUSCULOSKELETAL: back is not tender,FROM of the back  EXTREMITIES: no cyanosis, clubbing or edema  NEURO: Oriented times three      ASSESSMENT AND PLAN:   Mayra Del Cid is a 44 year old female who presents for an annual/pap.   . Self breast exam explained. Health maintenance. Body mass index is 40.17 kg/m²., recommended low fat diet and aerobic exercise 30 minutes three times weekly.  The patient indicates understanding of these issues and agrees to the plan.  The patient is asked to return for an annual visit.

## 2024-08-26 LAB — HPV E6+E7 MRNA CVX QL NAA+PROBE: NEGATIVE

## 2024-08-30 ENCOUNTER — LAB ENCOUNTER (OUTPATIENT)
Dept: LAB | Age: 44
End: 2024-08-30
Attending: INTERNAL MEDICINE
Payer: COMMERCIAL

## 2024-08-30 ENCOUNTER — OFFICE VISIT (OUTPATIENT)
Dept: INTERNAL MEDICINE CLINIC | Facility: CLINIC | Age: 44
End: 2024-08-30
Payer: COMMERCIAL

## 2024-08-30 VITALS
DIASTOLIC BLOOD PRESSURE: 88 MMHG | WEIGHT: 232.63 LBS | HEIGHT: 64 IN | BODY MASS INDEX: 39.72 KG/M2 | HEART RATE: 69 BPM | OXYGEN SATURATION: 99 % | TEMPERATURE: 97 F | SYSTOLIC BLOOD PRESSURE: 142 MMHG

## 2024-08-30 DIAGNOSIS — R40.0 DAYTIME SOMNOLENCE: ICD-10-CM

## 2024-08-30 DIAGNOSIS — R06.83 SNORING: ICD-10-CM

## 2024-08-30 DIAGNOSIS — Z00.00 ANNUAL PHYSICAL EXAM: Primary | ICD-10-CM

## 2024-08-30 DIAGNOSIS — Z00.00 ANNUAL PHYSICAL EXAM: ICD-10-CM

## 2024-08-30 DIAGNOSIS — Z12.4 CERVICAL CANCER SCREENING: ICD-10-CM

## 2024-08-30 DIAGNOSIS — I10 PRIMARY HYPERTENSION: ICD-10-CM

## 2024-08-30 DIAGNOSIS — R92.8 ABNORMAL MAMMOGRAM: ICD-10-CM

## 2024-08-30 DIAGNOSIS — R73.03 PREDIABETES: ICD-10-CM

## 2024-08-30 DIAGNOSIS — E78.5 DYSLIPIDEMIA: ICD-10-CM

## 2024-08-30 LAB
ALBUMIN SERPL-MCNC: 4.4 G/DL (ref 3.2–4.8)
ALBUMIN/GLOB SERPL: 1.3 {RATIO} (ref 1–2)
ALP LIVER SERPL-CCNC: 68 U/L
ALT SERPL-CCNC: 15 U/L
ANION GAP SERPL CALC-SCNC: 5 MMOL/L (ref 0–18)
AST SERPL-CCNC: 16 U/L (ref ?–34)
BASOPHILS # BLD AUTO: 0.06 X10(3) UL (ref 0–0.2)
BASOPHILS NFR BLD AUTO: 0.8 %
BILIRUB SERPL-MCNC: 0.5 MG/DL (ref 0.3–1.2)
BUN BLD-MCNC: 8 MG/DL (ref 9–23)
BUN/CREAT SERPL: 11.3 (ref 10–20)
CALCIUM BLD-MCNC: 9.1 MG/DL (ref 8.7–10.4)
CHLORIDE SERPL-SCNC: 108 MMOL/L (ref 98–112)
CHOLEST SERPL-MCNC: 163 MG/DL (ref ?–200)
CO2 SERPL-SCNC: 26 MMOL/L (ref 21–32)
CREAT BLD-MCNC: 0.71 MG/DL
DEPRECATED RDW RBC AUTO: 41.1 FL (ref 35.1–46.3)
EGFRCR SERPLBLD CKD-EPI 2021: 109 ML/MIN/1.73M2 (ref 60–?)
EOSINOPHIL # BLD AUTO: 0.12 X10(3) UL (ref 0–0.7)
EOSINOPHIL NFR BLD AUTO: 1.6 %
ERYTHROCYTE [DISTWIDTH] IN BLOOD BY AUTOMATED COUNT: 12.4 % (ref 11–15)
EST. AVERAGE GLUCOSE BLD GHB EST-MCNC: 117 MG/DL (ref 68–126)
FASTING PATIENT LIPID ANSWER: YES
FASTING STATUS PATIENT QL REPORTED: YES
GLOBULIN PLAS-MCNC: 3.3 G/DL (ref 2–3.5)
GLUCOSE BLD-MCNC: 97 MG/DL (ref 70–99)
HBA1C MFR BLD: 5.7 % (ref ?–5.7)
HCT VFR BLD AUTO: 43.8 %
HDLC SERPL-MCNC: 34 MG/DL (ref 40–59)
HGB BLD-MCNC: 14.7 G/DL
IMM GRANULOCYTES # BLD AUTO: 0.02 X10(3) UL (ref 0–1)
IMM GRANULOCYTES NFR BLD: 0.3 %
LDLC SERPL CALC-MCNC: 91 MG/DL (ref ?–100)
LYMPHOCYTES # BLD AUTO: 2.83 X10(3) UL (ref 1–4)
LYMPHOCYTES NFR BLD AUTO: 38.3 %
MCH RBC QN AUTO: 30.5 PG (ref 26–34)
MCHC RBC AUTO-ENTMCNC: 33.6 G/DL (ref 31–37)
MCV RBC AUTO: 90.9 FL
MONOCYTES # BLD AUTO: 0.46 X10(3) UL (ref 0.1–1)
MONOCYTES NFR BLD AUTO: 6.2 %
NEUTROPHILS # BLD AUTO: 3.9 X10 (3) UL (ref 1.5–7.7)
NEUTROPHILS # BLD AUTO: 3.9 X10(3) UL (ref 1.5–7.7)
NEUTROPHILS NFR BLD AUTO: 52.8 %
NONHDLC SERPL-MCNC: 129 MG/DL (ref ?–130)
OSMOLALITY SERPL CALC.SUM OF ELEC: 286 MOSM/KG (ref 275–295)
PLATELET # BLD AUTO: 342 10(3)UL (ref 150–450)
POTASSIUM SERPL-SCNC: 3.9 MMOL/L (ref 3.5–5.1)
PROT SERPL-MCNC: 7.7 G/DL (ref 5.7–8.2)
RBC # BLD AUTO: 4.82 X10(6)UL
SODIUM SERPL-SCNC: 139 MMOL/L (ref 136–145)
TRIGL SERPL-MCNC: 226 MG/DL (ref 30–149)
TSI SER-ACNC: 4.41 MIU/ML (ref 0.55–4.78)
VLDLC SERPL CALC-MCNC: 37 MG/DL (ref 0–30)
WBC # BLD AUTO: 7.4 X10(3) UL (ref 4–11)

## 2024-08-30 PROCEDURE — 85025 COMPLETE CBC W/AUTO DIFF WBC: CPT

## 2024-08-30 PROCEDURE — 80053 COMPREHEN METABOLIC PANEL: CPT

## 2024-08-30 PROCEDURE — 83036 HEMOGLOBIN GLYCOSYLATED A1C: CPT

## 2024-08-30 PROCEDURE — 84443 ASSAY THYROID STIM HORMONE: CPT

## 2024-08-30 PROCEDURE — 80061 LIPID PANEL: CPT

## 2024-08-30 PROCEDURE — 36415 COLL VENOUS BLD VENIPUNCTURE: CPT

## 2024-08-30 RX ORDER — AMLODIPINE BESYLATE 5 MG/1
5 TABLET ORAL DAILY
Qty: 90 TABLET | Refills: 0 | Status: SHIPPED | OUTPATIENT
Start: 2024-08-30

## 2024-08-30 NOTE — PROGRESS NOTES
Subjective:     Patient ID: Mayra Del Cid is a 44 year old female.    Patient presents today for her annual physical.        History/Other:   Review of Systems   Constitutional:  Positive for fatigue. Negative for appetite change, fever and unexpected weight change.   HENT:          Snoring     Eyes: Negative.    Respiratory: Negative.     Cardiovascular: Negative.    Gastrointestinal: Negative.  Negative for anal bleeding, blood in stool and vomiting.        No hematemesis   Genitourinary: Negative.    Allergic/Immunologic: Positive for environmental allergies and food allergies. Negative for immunocompromised state.   Hematological: Negative.      Current Outpatient Medications   Medication Sig Dispense Refill    Glucosamine-Chondroit-Calcium (TRIPLE FLEX BONE & JOINT OR) Take 1 tablet by mouth daily.      Ascorbic Acid (VITAMIN C) 1000 MG Oral Tab Take 1 tablet (1,000 mg total) by mouth daily.      zinc Sulfate 50 MG Oral Cap Take 50 mg by mouth daily.      COD LIVER OIL OR Take 2 capsules by mouth.      Cholecalciferol (VITAMIN D-3 OR) Take 1 capsule by mouth daily.      Multiple Vitamins-Minerals (CENTRUM) Oral Tab Take 1 tablet by mouth daily.      Mometasone Furo-Formoterol Fum (DULERA) 100-5 MCG/ACT Inhalation Aerosol Inhale 2 puffs into the lungs in the morning and 2 puffs before bedtime. (Patient not taking: Reported on 8/23/2024) 1 each 0    fluticasone propionate (FLOVENT HFA) 110 MCG/ACT Inhalation Aerosol Inhale 2 puffs into the lungs 2 (two) times daily. (Patient not taking: Reported on 8/23/2024) 1 each 0    guaiFENesin-codeine (CHERATUSSIN AC) 100-10 MG/5ML Oral Solution Take 5 mL by mouth every 6 (six) hours as needed for cough. (Patient not taking: Reported on 8/23/2024) 120 mL 0    COVID-19 Specimen Collection Does not apply Kit        Allergies:  Allergies   Allergen Reactions    Amoxicillin-Pot Clavulanate HIVES     Only happened once       Past Medical History:    DM (diabetes  mellitus), gestational (HCC)    Dyslipidemia      Past Surgical History:   Procedure Laterality Date            Family History   Problem Relation Age of Onset    Other (Other) Father         emphysema and glaucoma    Other (Other) Mother         osteoporosis    No Known Problems Brother     Breast Cancer Neg     Ovarian Cancer Neg     Colon Cancer Neg     Uterine Cancer Neg       Social History:   Social History     Socioeconomic History    Marital status:    Tobacco Use    Smoking status: Former     Current packs/day: 0.00     Types: Cigarettes     Quit date: 2014     Years since quitting: 10.2     Passive exposure: Past    Smokeless tobacco: Never    Tobacco comments:     Quit cold turkey   Vaping Use    Vaping status: Never Used   Substance and Sexual Activity    Alcohol use: Not Currently    Drug use: Never        Objective:   Physical Exam  Constitutional:       General: She is not in acute distress.     Appearance: She is well-developed. She is obese. She is not ill-appearing, toxic-appearing or diaphoretic.   HENT:      Head: Normocephalic and atraumatic.      Right Ear: Tympanic membrane, ear canal and external ear normal. There is no impacted cerumen.      Left Ear: Tympanic membrane, ear canal and external ear normal. There is no impacted cerumen.      Nose: Nose normal.      Mouth/Throat:      Pharynx: No oropharyngeal exudate.   Eyes:      General:         Right eye: No discharge.         Left eye: No discharge.      Conjunctiva/sclera: Conjunctivae normal.      Pupils: Pupils are equal, round, and reactive to light.   Neck:      Vascular: No JVD.   Cardiovascular:      Rate and Rhythm: Normal rate and regular rhythm.      Pulses: Normal pulses.      Heart sounds: Normal heart sounds. No murmur heard.     No gallop.   Pulmonary:      Effort: Pulmonary effort is normal. No respiratory distress.      Breath sounds: Normal breath sounds. No wheezing or rales.   Abdominal:      General:  Bowel sounds are normal. There is no distension.      Palpations: Abdomen is soft. There is no mass.      Tenderness: There is no abdominal tenderness. There is no guarding or rebound.   Musculoskeletal:         General: No tenderness. Normal range of motion.      Cervical back: Normal range of motion and neck supple. No rigidity or tenderness.      Right lower leg: No edema.      Left lower leg: No edema.   Lymphadenopathy:      Cervical: No cervical adenopathy.   Skin:     General: Skin is warm and dry.      Coloration: Skin is not jaundiced or pale.      Findings: No rash.   Neurological:      Mental Status: She is alert and oriented to person, place, and time.         Assessment & Plan:   (Z00.00) Annual physical exam  (primary encounter diagnosis)  Plan: CBC With Differential With Platelet, Comp         Metabolic Panel (14), Hemoglobin A1C, Lipid         Panel, TSH W Reflex To Free T4        Routine labs have been ordered.  Patient advised to get her flu shot as well as the latest COVID booster.    (E78.5) Dyslipidemia  Plan: Follow low-fat low-cholesterol diet.  Will check her lipid panel.    (R73.03) Prediabetes  Plan: Home Sleep Apnea Test (Adult pt only) - Sleep         consult required for Medicare pts        Will check her fasting glucose.  Will also check her A1c.  Cut down on carbs in diet and work on losing weight.    (Z68.39) BMI 39.0-39.9,adult  Plan: [facility] - Dr. Andrew Shearer [facility] 9, Home Sleep         Apnea Test (Adult pt only) - Sleep consult         required for Medicare pts, General sleep study        We had discussed regarding the need for her to lose weight.  I had recommended that she sees her weight clinic and she agreed.  Referral given.    (Z12.4) Cervical cancer screening  Plan: She is current with her Pap smear just done recently.    (R92.8) Abnormal mammogram  Plan: She already had scheduled her follow-up diagnostic  mammogram.    (R06.83) Snoring  Plan: Home Sleep Apnea Test (Adult pt only) - Sleep         consult required for Medicare pts, General         sleep study        See below.    (R40.0) Daytime somnolence  Plan: Home Sleep Apnea Test (Adult pt only) - Sleep         consult required for Medicare pts, General         sleep study        She is a candidate for a sleep study given her current symptoms of snoring, daytime somnolence and fatigue.  Will order home sleep study.    (I10) Primary hypertension  Plan: Her blood pressure been elevated even on previous visits as well as when she checks at home and even on the latest visit with her gynecologist.  I told her we will start her on blood pressure medicine amlodipine 5 mg daily and we will see her back in 4 to 6 weeks to recheck her blood pressure.       Orders Placed This Encounter   Procedures    CBC With Differential With Platelet    Comp Metabolic Panel (14)    Hemoglobin A1C    Lipid Panel    TSH W Reflex To Free T4       Meds This Visit:  Requested Prescriptions      No prescriptions requested or ordered in this encounter       Imaging & Referrals:  BARIATRICS - INTERNAL

## 2024-09-05 ENCOUNTER — HOSPITAL ENCOUNTER (OUTPATIENT)
Dept: MAMMOGRAPHY | Facility: HOSPITAL | Age: 44
Discharge: HOME | End: 2024-09-05
Attending: OBSTETRICS & GYNECOLOGY
Payer: COMMERCIAL

## 2024-09-05 ENCOUNTER — HOSPITAL ENCOUNTER (OUTPATIENT)
Dept: ULTRASOUND IMAGING | Facility: HOSPITAL | Age: 44
Discharge: HOME | End: 2024-09-05
Attending: OBSTETRICS & GYNECOLOGY
Payer: COMMERCIAL

## 2024-09-05 DIAGNOSIS — N64.89 BREAST ASYMMETRY: ICD-10-CM

## 2024-09-05 PROCEDURE — 77066 DX MAMMO INCL CAD BI: CPT | Performed by: OBSTETRICS & GYNECOLOGY

## 2024-09-05 PROCEDURE — 76642 ULTRASOUND BREAST LIMITED: CPT | Performed by: OBSTETRICS & GYNECOLOGY

## 2024-09-05 PROCEDURE — 77062 BREAST TOMOSYNTHESIS BI: CPT | Performed by: OBSTETRICS & GYNECOLOGY

## 2024-09-08 ENCOUNTER — TELEPHONE (OUTPATIENT)
Dept: OBGYN CLINIC | Facility: CLINIC | Age: 44
End: 2024-09-08

## 2024-09-08 DIAGNOSIS — R92.8 ABNORMAL MAMMOGRAM: Primary | ICD-10-CM

## 2024-10-01 ENCOUNTER — MED REC SCAN ONLY (OUTPATIENT)
Dept: INTERNAL MEDICINE CLINIC | Facility: CLINIC | Age: 44
End: 2024-10-01

## 2024-10-04 ENCOUNTER — OFFICE VISIT (OUTPATIENT)
Dept: INTERNAL MEDICINE CLINIC | Facility: CLINIC | Age: 44
End: 2024-10-04
Payer: COMMERCIAL

## 2024-10-04 ENCOUNTER — PATIENT MESSAGE (OUTPATIENT)
Dept: INTERNAL MEDICINE CLINIC | Facility: CLINIC | Age: 44
End: 2024-10-04

## 2024-10-04 VITALS
SYSTOLIC BLOOD PRESSURE: 124 MMHG | WEIGHT: 237.5 LBS | HEART RATE: 77 BPM | DIASTOLIC BLOOD PRESSURE: 82 MMHG | BODY MASS INDEX: 41 KG/M2

## 2024-10-04 DIAGNOSIS — R73.03 PREDIABETES: ICD-10-CM

## 2024-10-04 DIAGNOSIS — E66.01 MORBID OBESITY DUE TO EXCESS CALORIES (HCC): ICD-10-CM

## 2024-10-04 DIAGNOSIS — I10 PRIMARY HYPERTENSION: Primary | ICD-10-CM

## 2024-10-04 DIAGNOSIS — E78.5 DYSLIPIDEMIA: ICD-10-CM

## 2024-10-04 DIAGNOSIS — E66.01 OBESITY, CLASS III, BMI 40-49.9 (MORBID OBESITY) (HCC): Primary | ICD-10-CM

## 2024-10-04 PROCEDURE — 3074F SYST BP LT 130 MM HG: CPT | Performed by: INTERNAL MEDICINE

## 2024-10-04 PROCEDURE — 99213 OFFICE O/P EST LOW 20 MIN: CPT | Performed by: INTERNAL MEDICINE

## 2024-10-04 PROCEDURE — 3079F DIAST BP 80-89 MM HG: CPT | Performed by: INTERNAL MEDICINE

## 2024-10-04 RX ORDER — TIRZEPATIDE 2.5 MG/.5ML
2.5 INJECTION, SOLUTION SUBCUTANEOUS WEEKLY
Qty: 2 ML | Refills: 0 | Status: SHIPPED | OUTPATIENT
Start: 2024-10-04 | End: 2024-10-06

## 2024-10-04 NOTE — PROGRESS NOTES
Subjective:     Patient ID: Mayra Del Cid is a 44 year old female.    Hypertension  This is a new problem. The current episode started more than 1 month ago. The problem has been gradually improving since onset. The problem is controlled. Pertinent negatives include no chest pain, peripheral edema or shortness of breath. There are no associated agents to hypertension. Risk factors for coronary artery disease include dyslipidemia and obesity. Past treatments include calcium channel blockers and lifestyle changes. The current treatment provides significant improvement. There are no compliance problems.  There is no history of angina, kidney disease, CAD/MI, CVA, heart failure or PVD. There is no history of chronic renal disease, a hypertension causing med or a thyroid problem.       History/Other:   Review of Systems   Respiratory:  Negative for shortness of breath.    Cardiovascular:  Negative for chest pain.   Current Outpatient Medications   Medication Sig Dispense Refill   • amLODIPine 5 MG Oral Tab Take 1 tablet (5 mg total) by mouth daily. 90 tablet 0   • Glucosamine-Chondroit-Calcium (TRIPLE FLEX BONE & JOINT OR) Take 1 tablet by mouth daily.     • COVID-19 Specimen Collection Does not apply Kit      • Ascorbic Acid (VITAMIN C) 1000 MG Oral Tab Take 1 tablet (1,000 mg total) by mouth daily.     • zinc Sulfate 50 MG Oral Cap Take 50 mg by mouth daily.     • COD LIVER OIL OR Take 2 capsules by mouth.     • Cholecalciferol (VITAMIN D-3 OR) Take 1 capsule by mouth daily.     • Multiple Vitamins-Minerals (CENTRUM) Oral Tab Take 1 tablet by mouth daily.     • Mometasone Furo-Formoterol Fum (DULERA) 100-5 MCG/ACT Inhalation Aerosol Inhale 2 puffs into the lungs in the morning and 2 puffs before bedtime. (Patient not taking: Reported on 10/4/2024) 1 each 0   • fluticasone propionate (FLOVENT HFA) 110 MCG/ACT Inhalation Aerosol Inhale 2 puffs into the lungs 2 (two) times daily. (Patient not taking: Reported on  10/4/2024) 1 each 0     Allergies:  Allergies   Allergen Reactions   • Amoxicillin-Pot Clavulanate HIVES     Only happened once       Past Medical History:   • DM (diabetes mellitus), gestational (HCC)   • Dyslipidemia      Past Surgical History:   Procedure Laterality Date   •         Family History   Problem Relation Age of Onset   • Other (Other) Father         emphysema and glaucoma   • Other (Other) Mother         osteoporosis   • No Known Problems Brother    • Breast Cancer Neg    • Ovarian Cancer Neg    • Colon Cancer Neg    • Uterine Cancer Neg       Social History:   Social History     Socioeconomic History   • Marital status:    Tobacco Use   • Smoking status: Former     Current packs/day: 0.00     Types: Cigarettes     Quit date: 2014     Years since quitting: 10.3     Passive exposure: Past   • Smokeless tobacco: Never   • Tobacco comments:     Quit cold turkey   Vaping Use   • Vaping status: Never Used   Substance and Sexual Activity   • Alcohol use: Not Currently   • Drug use: Never        Objective:   Physical Exam  Constitutional:       General: She is not in acute distress.     Appearance: She is well-developed. She is obese. She is not ill-appearing, toxic-appearing or diaphoretic.   HENT:      Head: Normocephalic and atraumatic.      Right Ear: External ear normal.      Left Ear: External ear normal.      Nose: Nose normal.      Mouth/Throat:      Pharynx: No oropharyngeal exudate.   Eyes:      General:         Right eye: No discharge.         Left eye: No discharge.      Conjunctiva/sclera: Conjunctivae normal.      Pupils: Pupils are equal, round, and reactive to light.   Neck:      Vascular: No JVD.   Cardiovascular:      Rate and Rhythm: Normal rate and regular rhythm.      Heart sounds: Normal heart sounds. No murmur heard.  Pulmonary:      Effort: Pulmonary effort is normal. No respiratory distress.      Breath sounds: Normal breath sounds. No wheezing or rales.    Abdominal:      General: Bowel sounds are normal. There is no distension.      Palpations: Abdomen is soft. There is no mass.      Tenderness: There is no abdominal tenderness. There is no guarding or rebound.   Musculoskeletal:         General: Normal range of motion.      Cervical back: Normal range of motion and neck supple. Tenderness present. No rigidity.      Right lower leg: No edema.      Left lower leg: No edema.   Lymphadenopathy:      Cervical: No cervical adenopathy.   Skin:     General: Skin is warm and dry.      Coloration: Skin is not jaundiced or pale.      Findings: No rash.   Neurological:      Mental Status: She is alert and oriented to person, place, and time.       Assessment & Plan:   (I10) Primary hypertension  (primary encounter diagnosis)  Plan: Blood pressure now controlled with current blood pressure med.  CPM.    (E66.01) Morbid obesity due to excess calories (HCC)  Plan: Patient has morbid obesity with comorbidities such as her prediabetes dyslipidemia.  She has been trying several types of diet as well as exercises but has not been successful.  We have talked about seeing bariatric clinic however the earliest available appointment is not until 6 months from now.  She does not have a current indications for use of GLP-1 agonist for helping with lose weight.  We also talked about the potential side effects of this class of medicine.  She is agreeable and we will try to prescribe and see if this will be approved by her insurance.  Will start her on Zepbound.    (R73.03) Prediabetes  Plan: Continue working on her diet as well as working on losing weight as discussed above.    (E78.5) Dyslipidemia  Plan: Triglycerides are elevated and her HDL is low.  I did recommend to start with omega-3 fatty acid.  Again working on losing weight has been discussed as above.       No orders of the defined types were placed in this encounter.      Meds This Visit:  Requested Prescriptions      No  prescriptions requested or ordered in this encounter       Imaging & Referrals:  None

## 2024-10-06 RX ORDER — TIRZEPATIDE 2.5 MG/.5ML
2.5 INJECTION, SOLUTION SUBCUTANEOUS WEEKLY
Qty: 2 ML | Refills: 0 | Status: SHIPPED | OUTPATIENT
Start: 2024-10-06 | End: 2024-10-08

## 2024-10-06 NOTE — TELEPHONE ENCOUNTER
From: Mayra Del Cid  To: Shravan Denton  Sent: 10/4/2024 11:46 PM CDT  Subject: Zepbound    Hello doc! Just checked my insurance. Per benefits, they may not be able to cover zepbound but possibly cover under brand Mounjaro but needs Prior Authorization. Thank you always and have a great day!

## 2024-10-07 ENCOUNTER — OFFICE VISIT (OUTPATIENT)
Dept: SLEEP CENTER | Age: 44
End: 2024-10-07
Attending: INTERNAL MEDICINE
Payer: COMMERCIAL

## 2024-10-07 ENCOUNTER — TELEPHONE (OUTPATIENT)
Dept: FAMILY MEDICINE CLINIC | Facility: CLINIC | Age: 44
End: 2024-10-07

## 2024-10-07 DIAGNOSIS — R40.0 DAYTIME SOMNOLENCE: ICD-10-CM

## 2024-10-07 DIAGNOSIS — R06.83 SNORING: ICD-10-CM

## 2024-10-07 PROCEDURE — 95806 SLEEP STUDY UNATT&RESP EFFT: CPT

## 2024-10-07 NOTE — TELEPHONE ENCOUNTER
Prior Authorization Needed:    Current Outpatient Medications   Medication Sig Dispense Refill    Tirzepatide (MOUNJARO) 2.5 MG/0.5ML Subcutaneous Solution Pen-injector Inject 2.5 mg into the skin once a week for 4 doses. 2 mL 0     Login to [facility]/login and click \"Enter a Key\"    Key: [id number]  Last Name: Nehemias  : 07/10/1980    Please advise

## 2024-10-07 NOTE — TELEPHONE ENCOUNTER
Patient does not meet criteria, she does not have type 2 diabetes.  Please consider medication for weight loss    Primary hypertension I10    Morbid obesity due to excess calories (HCC) E66.01    Prediabetes R73.03    Dyslipidemia E78.5

## 2024-10-08 RX ORDER — TIRZEPATIDE 2.5 MG/.5ML
2.5 INJECTION, SOLUTION SUBCUTANEOUS WEEKLY
Qty: 2 ML | Refills: 0 | Status: SHIPPED | OUTPATIENT
Start: 2024-10-08 | End: 2024-10-30

## 2024-10-08 NOTE — TELEPHONE ENCOUNTER
Patient does not meet criteria for mounjaro, she does not have type 2 diabetes.  Zepbound is appropriate for weight loss reasons.     See alternative encounter sent to you

## 2024-10-08 NOTE — TELEPHONE ENCOUNTER
I tried to initiate prior authorization through covermymeds.     There was an error with your request  Member Not Found

## 2024-10-08 NOTE — TELEPHONE ENCOUNTER
Just to clarify. I had sent initially zepbound since it was beign prescribed for her morbid obesity;    Pt called her insurance and was told mounjaro is the one covered that's why I sent in mounjaro.

## 2024-10-08 NOTE — TELEPHONE ENCOUNTER
Will start her on Zepbound     Mounjaro was ordered, however the documentation stated Zepbound. Please send correct medication and route back to triage support to assist with prior auth

## 2024-10-09 NOTE — TELEPHONE ENCOUNTER
Zebound requires prior auth  # for Pre-auth med [phone]  Pharmacy program [phone]        Please find attached insurance card. Hope we can find coverage. Thank you so much!   Attachments   [id number]     [id number].jpg

## 2024-10-10 NOTE — TELEPHONE ENCOUNTER
Pls notify pt regarding not qualifying and insurance wll not be covering zepbound or this class of med for obesity.  Keep apptment with weight clinic since they can offer other meds that may help her lose weight

## 2024-10-12 ENCOUNTER — IMMUNIZATION (OUTPATIENT)
Dept: FAMILY MEDICINE CLINIC | Facility: CLINIC | Age: 44
End: 2024-10-12
Payer: COMMERCIAL

## 2024-10-12 DIAGNOSIS — Z23 NEED FOR INFLUENZA VACCINATION: Primary | ICD-10-CM

## 2024-10-12 PROCEDURE — 90471 IMMUNIZATION ADMIN: CPT | Performed by: NURSE PRACTITIONER

## 2024-10-12 PROCEDURE — 90656 IIV3 VACC NO PRSV 0.5 ML IM: CPT | Performed by: NURSE PRACTITIONER

## 2024-10-13 ENCOUNTER — TELEPHONE (OUTPATIENT)
Dept: INTERNAL MEDICINE CLINIC | Facility: CLINIC | Age: 44
End: 2024-10-13

## 2024-10-13 DIAGNOSIS — G47.33 OSA (OBSTRUCTIVE SLEEP APNEA): Primary | ICD-10-CM

## 2024-11-17 RX ORDER — AMLODIPINE BESYLATE 5 MG/1
5 TABLET ORAL DAILY
Qty: 90 TABLET | Refills: 3 | Status: SHIPPED | OUTPATIENT
Start: 2024-11-17

## 2024-11-17 NOTE — TELEPHONE ENCOUNTER
Refill passed per [facility] protocol.    Requested Prescriptions   Pending Prescriptions Disp Refills    amLODIPine 5 MG Oral Tab 90 tablet 0     Sig: Take 1 tablet (5 mg total) by mouth daily.       Hypertension Medications Protocol Passed - 11/17/2024  9:37 AM        Passed - CMP or BMP in past 12 months        Passed - Last BP reading less than 140/90     BP Readings from Last 1 Encounters:   10/04/24 124/82               Passed - In person appointment or virtual visit in the past 12 mos or appointment in next 3 mos     Recent Outpatient Visits              1 month ago BMI 39.0-39.9,adult    [facility]    Office Visit    1 month ago Primary hypertension    [facility] Shravan Denton MD    Office Visit    2 months ago Annual physical exam    [facility] Shravan Denton MD    Office Visit    2 months ago Well woman exam with routine gynecological exam    [facility] Jose Grimes MD    Office Visit    11 months ago [facility]          Future Appointments         Provider Department Appt Notes    In 1 week [facility]     In 4 weeks Mehrdad Faria DO [facility] Sleep apnea  (Policy informed)    In 3 months [address] for Health     In 5 months Andrew Shearer MD [facility] Weight management to prevent illnesses                    Passed - EGFRCR or GFRNAA > 50     GFR Evaluation  EGFRCR: 109 , resulted on 8/30/2024               Future Appointments         Provider Department Appt Notes    In 1 week [facility]     In 4 weeks Mehrdad Faria DO  [facility] Sleep apnea  (Policy informed)    In 3 months [facility] for [facility]     In 5 months Andrew Shearer MD [facility] Weight management to prevent illnesses            Recent Outpatient Visits              1 month ago BMI 39.0-39.9,adult    [facility]    Office Visit    1 month ago Primary hypertension    [facility] Shravan Denton MD    Office Visit    2 months ago Annual physical exam    [facility] Shravan Martínez MD    Office Visit    2 months ago Well woman exam with routine gynecological exam    [facility] Jose Grimes MD    Office Visit    11 months ago Acute cough    [facility] Shravan Martínez MD    Office Visit

## 2024-11-30 ENCOUNTER — OFFICE VISIT (OUTPATIENT)
Dept: SLEEP CENTER | Age: 44
End: 2024-11-30
Attending: INTERNAL MEDICINE
Payer: COMMERCIAL

## 2024-11-30 DIAGNOSIS — G47.33 OSA (OBSTRUCTIVE SLEEP APNEA): Primary | ICD-10-CM

## 2024-11-30 PROCEDURE — 95811 POLYSOM 6/>YRS CPAP 4/> PARM: CPT

## 2024-12-03 ENCOUNTER — ORDER TRANSCRIPTION (OUTPATIENT)
Dept: SLEEP CENTER | Age: 44
End: 2024-12-03

## 2024-12-03 DIAGNOSIS — G47.33 OBSTRUCTIVE SLEEP APNEA (ADULT) (PEDIATRIC): Primary | ICD-10-CM

## 2024-12-10 ENCOUNTER — TELEPHONE (OUTPATIENT)
Dept: INTERNAL MEDICINE CLINIC | Facility: CLINIC | Age: 44
End: 2024-12-10

## 2024-12-10 NOTE — TELEPHONE ENCOUNTER
Order placed on parachute    CPAP Package    CPAP Machine, Generic -     PAP Mask, Fit to Comfort, 1 per 3 months. -     PAP Headgear, 1 per 6 months -     PAP Humidifier, Heated -     PAP Mask Interface Cushion, Fit to Comfort.    Disposable PAP Filter, 2 per 1 month -     Non-Disposable PAP Filter, 1 per 6 months -     PAP Machine Tubing, Heated, 1 per 3 months -     Humidifier Water Chamber, 1 per 6 months -     Quantity  1  Prescription Details  Fixed Pressure - 11 cm  Oxygen Usage - None  Additional Settings - CPAP at 11 cm H2O, with humidity at 3 and EPR on.  Supplier  [facility]

## 2024-12-10 NOTE — TELEPHONE ENCOUNTER
----- Message from Shravan Denton sent at 12/10/2024 11:44 AM CST -----  Cpap titration test done and cpap pressure already known   Pls order cpap machine and supplies for pt  Also pt will need to ffup with sleep specialist DR Panchito Toney and partners a month after using cpap machine.

## 2025-01-24 ENCOUNTER — OFFICE VISIT (OUTPATIENT)
Dept: INTERNAL MEDICINE CLINIC | Facility: CLINIC | Age: 45
End: 2025-01-24
Payer: COMMERCIAL

## 2025-01-24 VITALS
SYSTOLIC BLOOD PRESSURE: 128 MMHG | HEIGHT: 64 IN | WEIGHT: 241 LBS | OXYGEN SATURATION: 97 % | BODY MASS INDEX: 41.15 KG/M2 | HEART RATE: 87 BPM | DIASTOLIC BLOOD PRESSURE: 86 MMHG | RESPIRATION RATE: 18 BRPM

## 2025-01-24 DIAGNOSIS — G47.33 OSA ON CPAP: Primary | ICD-10-CM

## 2025-01-24 DIAGNOSIS — E66.01 OBESITY, CLASS III, BMI 40-49.9 (MORBID OBESITY) (HCC): ICD-10-CM

## 2025-01-24 PROCEDURE — 3074F SYST BP LT 130 MM HG: CPT | Performed by: INTERNAL MEDICINE

## 2025-01-24 PROCEDURE — 3008F BODY MASS INDEX DOCD: CPT | Performed by: INTERNAL MEDICINE

## 2025-01-24 PROCEDURE — 3079F DIAST BP 80-89 MM HG: CPT | Performed by: INTERNAL MEDICINE

## 2025-01-24 PROCEDURE — 99213 OFFICE O/P EST LOW 20 MIN: CPT | Performed by: INTERNAL MEDICINE

## 2025-01-24 RX ORDER — LABETALOL 100 MG/1
100 TABLET, FILM COATED ORAL 2 TIMES DAILY
Qty: 180 TABLET | Refills: 0 | Status: SHIPPED | OUTPATIENT
Start: 2025-01-24

## 2025-01-25 NOTE — PROGRESS NOTES
Subjective:     Patient ID: Mayra Del Cid is a 44 year old female.    Patient presents today for follow-up regarding her obstructive sleep apnea.  She had been using her CPAP machine now for the past 4 weeks had been tolerating it well and uses it for at least 6 to 8 hours on a nightly basis.  She is feeling better with more energy and her daytime somnolence had now resolved.        History/Other:   Review of Systems   Constitutional: Negative.  Negative for fatigue and unexpected weight change.   Respiratory: Negative.     Cardiovascular: Negative.      Current Outpatient Medications   Medication Sig Dispense Refill    labetalol 100 MG Oral Tab Take 1 tablet (100 mg total) by mouth 2 (two) times daily. 180 tablet 0    amLODIPine 5 MG Oral Tab Take 1 tablet (5 mg total) by mouth daily. 90 tablet 3    Glucosamine-Chondroit-Calcium (TRIPLE FLEX BONE & JOINT OR) Take 1 tablet by mouth daily.      Ascorbic Acid (VITAMIN C) 1000 MG Oral Tab Take 1 tablet (1,000 mg total) by mouth daily.      COD LIVER OIL OR Take 2 capsules by mouth.      Cholecalciferol (VITAMIN D-3 OR) Take 1 capsule by mouth daily.      Multiple Vitamins-Minerals (CENTRUM) Oral Tab Take 1 tablet by mouth daily.      COVID-19 Specimen Collection Does not apply Kit  (Patient not taking: Reported on 2025)      zinc Sulfate 50 MG Oral Cap Take 50 mg by mouth daily.       Allergies:Allergies[1]    Past Medical History:    DM (diabetes mellitus), gestational (HCC)    Dyslipidemia      Past Surgical History:   Procedure Laterality Date            Family History   Problem Relation Age of Onset    Other (Other) Father         emphysema and glaucoma    Other (Other) Mother         osteoporosis    No Known Problems Brother     Breast Cancer Neg     Ovarian Cancer Neg     Colon Cancer Neg     Uterine Cancer Neg       Social History:   Social History     Socioeconomic History    Marital status:    Tobacco Use    Smoking status:  Former     Current packs/day: 0.00     Types: Cigarettes     Quit date: 6/16/2014     Years since quitting: 10.6     Passive exposure: Past    Smokeless tobacco: Never    Tobacco comments:     Quit cold turkey   Vaping Use    Vaping status: Never Used   Substance and Sexual Activity    Alcohol use: Not Currently    Drug use: Never        Objective:   Physical Exam  Constitutional:       General: She is not in acute distress.     Appearance: She is well-developed. She is obese. She is not ill-appearing, toxic-appearing or diaphoretic.   HENT:      Head: Normocephalic and atraumatic.      Right Ear: External ear normal.      Left Ear: External ear normal.      Nose: Nose normal.      Mouth/Throat:      Pharynx: No oropharyngeal exudate.   Eyes:      General:         Right eye: No discharge.         Left eye: No discharge.      Conjunctiva/sclera: Conjunctivae normal.      Pupils: Pupils are equal, round, and reactive to light.   Neck:      Vascular: No JVD.   Cardiovascular:      Rate and Rhythm: Normal rate and regular rhythm.      Heart sounds: Normal heart sounds.   Pulmonary:      Effort: Pulmonary effort is normal. No respiratory distress.      Breath sounds: Normal breath sounds. No wheezing or rales.   Abdominal:      General: Bowel sounds are normal. There is no distension.      Palpations: Abdomen is soft. There is no mass.      Tenderness: There is no abdominal tenderness. There is no guarding or rebound.   Musculoskeletal:         General: No tenderness. Normal range of motion.      Cervical back: Normal range of motion and neck supple. No rigidity or tenderness.      Right lower leg: No edema.      Left lower leg: No edema.   Lymphadenopathy:      Cervical: No cervical adenopathy.   Skin:     General: Skin is warm and dry.      Findings: No rash.   Neurological:      Mental Status: She is alert and oriented to person, place, and time.         Assessment & Plan:   (G47.33) JUAN MANUEL on CPAP  (primary encounter  diagnosis)  Plan: As tolerating the use of her CPAP, feeling better with more energy as well as resolution of her daytime somnolence.  She is compliant with the use at least using it for at least 6 to 8 hours nightly.  Patient is benefiting well from her CPAP machine and will continue.  She will also will be seeing a sleep specialist.    (E66.01) Obesity, Class III, BMI 40-49.9 (morbid obesity) (HCC)  Plan: Had discussed with the patient again the need to continue to work on losing weight.  Advise regarding doing regular exercises cutting back on calories particularly carbs.  She also has an appointment to see our weight clinic.       No orders of the defined types were placed in this encounter.      Meds This Visit:  Requested Prescriptions     Signed Prescriptions Disp Refills    labetalol 100 MG Oral Tab 180 tablet 0     Sig: Take 1 tablet (100 mg total) by mouth 2 (two) times daily.       Imaging & Referrals:  None            [1]   Allergies  Allergen Reactions    Amoxicillin-Pot Clavulanate HIVES     Only happened once

## 2025-03-03 ENCOUNTER — TELEPHONE (OUTPATIENT)
Dept: PULMONOLOGY | Facility: CLINIC | Age: 45
End: 2025-03-03

## 2025-03-03 ENCOUNTER — OFFICE VISIT (OUTPATIENT)
Dept: INTERNAL MEDICINE CLINIC | Facility: CLINIC | Age: 45
End: 2025-03-03
Payer: COMMERCIAL

## 2025-03-03 ENCOUNTER — LAB ENCOUNTER (OUTPATIENT)
Dept: LAB | Age: 45
End: 2025-03-03
Attending: INTERNAL MEDICINE
Payer: COMMERCIAL

## 2025-03-03 VITALS
DIASTOLIC BLOOD PRESSURE: 76 MMHG | WEIGHT: 243.56 LBS | SYSTOLIC BLOOD PRESSURE: 124 MMHG | BODY MASS INDEX: 42 KG/M2 | HEART RATE: 67 BPM

## 2025-03-03 DIAGNOSIS — R73.03 PREDIABETES: ICD-10-CM

## 2025-03-03 DIAGNOSIS — E78.5 DYSLIPIDEMIA: ICD-10-CM

## 2025-03-03 DIAGNOSIS — I10 PRIMARY HYPERTENSION: Primary | ICD-10-CM

## 2025-03-03 LAB
ALBUMIN SERPL-MCNC: 4.4 G/DL (ref 3.2–4.8)
ALBUMIN/GLOB SERPL: 1.6 {RATIO} (ref 1–2)
ALP LIVER SERPL-CCNC: 76 U/L
ALT SERPL-CCNC: 24 U/L
ANION GAP SERPL CALC-SCNC: 8 MMOL/L (ref 0–18)
AST SERPL-CCNC: 17 U/L (ref ?–34)
BILIRUB SERPL-MCNC: 0.4 MG/DL (ref 0.3–1.2)
BUN BLD-MCNC: 6 MG/DL (ref 9–23)
BUN/CREAT SERPL: 8.7 (ref 10–20)
CALCIUM BLD-MCNC: 8.9 MG/DL (ref 8.7–10.4)
CHLORIDE SERPL-SCNC: 108 MMOL/L (ref 98–112)
CHOLEST SERPL-MCNC: 170 MG/DL (ref ?–200)
CO2 SERPL-SCNC: 24 MMOL/L (ref 21–32)
CREAT BLD-MCNC: 0.69 MG/DL
EGFRCR SERPLBLD CKD-EPI 2021: 111 ML/MIN/1.73M2 (ref 60–?)
EST. AVERAGE GLUCOSE BLD GHB EST-MCNC: 117 MG/DL (ref 68–126)
FASTING PATIENT LIPID ANSWER: YES
FASTING STATUS PATIENT QL REPORTED: YES
GLOBULIN PLAS-MCNC: 2.8 G/DL (ref 2–3.5)
GLUCOSE BLD-MCNC: 97 MG/DL (ref 70–99)
HBA1C MFR BLD: 5.7 % (ref ?–5.7)
HDLC SERPL-MCNC: 41 MG/DL (ref 40–59)
LDLC SERPL CALC-MCNC: 96 MG/DL (ref ?–100)
NONHDLC SERPL-MCNC: 129 MG/DL (ref ?–130)
OSMOLALITY SERPL CALC.SUM OF ELEC: 288 MOSM/KG (ref 275–295)
POTASSIUM SERPL-SCNC: 4.1 MMOL/L (ref 3.5–5.1)
PROT SERPL-MCNC: 7.2 G/DL (ref 5.7–8.2)
SODIUM SERPL-SCNC: 140 MMOL/L (ref 136–145)
TRIGL SERPL-MCNC: 194 MG/DL (ref 30–149)
VLDLC SERPL CALC-MCNC: 32 MG/DL (ref 0–30)

## 2025-03-03 PROCEDURE — 80061 LIPID PANEL: CPT

## 2025-03-03 PROCEDURE — 3074F SYST BP LT 130 MM HG: CPT | Performed by: INTERNAL MEDICINE

## 2025-03-03 PROCEDURE — 99213 OFFICE O/P EST LOW 20 MIN: CPT | Performed by: INTERNAL MEDICINE

## 2025-03-03 PROCEDURE — 80053 COMPREHEN METABOLIC PANEL: CPT

## 2025-03-03 PROCEDURE — 36415 COLL VENOUS BLD VENIPUNCTURE: CPT

## 2025-03-03 PROCEDURE — 83036 HEMOGLOBIN GLYCOSYLATED A1C: CPT

## 2025-03-03 PROCEDURE — 3078F DIAST BP <80 MM HG: CPT | Performed by: INTERNAL MEDICINE

## 2025-03-03 NOTE — PROGRESS NOTES
Subjective:     Patient ID: Mayra Del Cid is a 44 year old female.    Hypertension  This is a chronic problem. The current episode started more than 1 month ago. The problem has been gradually improving since onset. The problem is controlled. Pertinent negatives include no chest pain, palpitations, peripheral edema or shortness of breath. There are no associated agents to hypertension. Risk factors for coronary artery disease include dyslipidemia and obesity. Past treatments include calcium channel blockers and beta blockers. The current treatment provides significant improvement. There are no compliance problems.  There is no history of angina, kidney disease, CAD/MI, CVA, heart failure or PVD. Identifiable causes of hypertension include sleep apnea. There is no history of chronic renal disease, a hypertension causing med or a thyroid problem.   Blood Sugar  This is a chronic (prediabetes) problem. The current episode started more than 1 month ago. The problem occurs constantly. The problem has been unchanged. Pertinent negatives include no chest pain. Associated symptoms comments: none. Exacerbated by: obesity. Treatments tried: diet and exercise.   Hyperlipidemia  This is a chronic problem. The current episode started more than 1 month ago. Recent lipid tests were reviewed and are high. Exacerbating diseases include obesity. She has no history of chronic renal disease, diabetes, hypothyroidism or liver disease. Pertinent negatives include no chest pain or shortness of breath. Current antihyperlipidemic treatment includes diet change and exercise. There are no compliance problems.  Risk factors for coronary artery disease include dyslipidemia and hypertension.       History/Other:   Review of Systems   Constitutional: Negative.    Respiratory: Negative.  Negative for shortness of breath.    Cardiovascular: Negative.  Negative for chest pain and palpitations.   Gastrointestinal: Negative.     Genitourinary: Negative.    Neurological:  Negative for syncope.     Current Outpatient Medications   Medication Sig Dispense Refill    labetalol 100 MG Oral Tab Take 1 tablet (100 mg total) by mouth 2 (two) times daily. 180 tablet 0    amLODIPine 5 MG Oral Tab Take 1 tablet (5 mg total) by mouth daily. 90 tablet 3    Glucosamine-Chondroit-Calcium (TRIPLE FLEX BONE & JOINT OR) Take 1 tablet by mouth daily.      COVID-19 Specimen Collection Does not apply Kit       Ascorbic Acid (VITAMIN C) 1000 MG Oral Tab Take 1 tablet (1,000 mg total) by mouth daily.      zinc Sulfate 50 MG Oral Cap Take 50 mg by mouth daily.      COD LIVER OIL OR Take 2 capsules by mouth.      Cholecalciferol (VITAMIN D-3 OR) Take 1 capsule by mouth daily.      Multiple Vitamins-Minerals (CENTRUM) Oral Tab Take 1 tablet by mouth daily.       Allergies:Allergies[1]    Past Medical History:    DM (diabetes mellitus), gestational (HCC)    Dyslipidemia      Past Surgical History:   Procedure Laterality Date            Family History   Problem Relation Age of Onset    Other (Other) Father         emphysema and glaucoma    Other (Other) Mother         osteoporosis    No Known Problems Brother     Breast Cancer Neg     Ovarian Cancer Neg     Colon Cancer Neg     Uterine Cancer Neg       Social History:   Social History     Socioeconomic History    Marital status:    Tobacco Use    Smoking status: Former     Current packs/day: 0.00     Types: Cigarettes     Quit date: 2014     Years since quitting: 10.7     Passive exposure: Past    Smokeless tobacco: Never    Tobacco comments:     Quit cold turkey   Vaping Use    Vaping status: Never Used   Substance and Sexual Activity    Alcohol use: Not Currently    Drug use: Never        Objective:   Physical Exam  Constitutional:       General: She is not in acute distress.     Appearance: She is well-developed. She is obese. She is not ill-appearing, toxic-appearing or diaphoretic.   HENT:       Head: Normocephalic and atraumatic.      Right Ear: External ear normal.      Left Ear: External ear normal.      Nose: Nose normal.      Mouth/Throat:      Pharynx: No oropharyngeal exudate.   Eyes:      General:         Right eye: No discharge.         Left eye: No discharge.      Conjunctiva/sclera: Conjunctivae normal.      Pupils: Pupils are equal, round, and reactive to light.   Neck:      Vascular: No JVD.   Cardiovascular:      Rate and Rhythm: Normal rate and regular rhythm.      Heart sounds: Normal heart sounds. No murmur heard.  Pulmonary:      Effort: Pulmonary effort is normal. No respiratory distress.      Breath sounds: Normal breath sounds. No wheezing or rales.   Abdominal:      General: Bowel sounds are normal. There is no distension.      Palpations: Abdomen is soft. There is no mass.      Tenderness: There is no abdominal tenderness. There is no guarding or rebound.   Musculoskeletal:         General: No tenderness. Normal range of motion.      Cervical back: Normal range of motion and neck supple. No rigidity or tenderness.      Right lower leg: No edema.      Left lower leg: No edema.   Lymphadenopathy:      Cervical: No cervical adenopathy.   Skin:     General: Skin is warm and dry.      Coloration: Skin is not jaundiced or pale.      Findings: No rash.   Neurological:      Mental Status: She is alert and oriented to person, place, and time.         Assessment & Plan:   (I10) Primary hypertension  (primary encounter diagnosis)  Plan: Her blood pressure is controlled with current blood pressure meds.  CPM.    (R73.03) Prediabetes  Plan: Hemoglobin A1C        Will check her fasting glucose and A1c.  Continue to watch diet cut down on carbs.  Continue to work on losing weight.  She already has an appointment to see our weight clinic later this month.    (E78.5) Dyslipidemia  Plan: Lipid Panel, Comp Metabolic Panel (14)        Check lipid panel.  Follow low-fat low-cholesterol diet.       No  orders of the defined types were placed in this encounter.      Meds This Visit:  Requested Prescriptions      No prescriptions requested or ordered in this encounter       Imaging & Referrals:  None            [1]   Allergies  Allergen Reactions    Amoxicillin-Pot Clavulanate HIVES     Only happened once

## 2025-03-03 NOTE — TELEPHONE ENCOUNTER
Patient upcoming appointment was canceled by the office and patient declined the next date requesting to speak to Rn please call

## 2025-03-05 NOTE — TELEPHONE ENCOUNTER
Dr. Faria-patient was scheduled for JUAN MANUEL CONSULT on 3/31/2025 but was rescheduled due to providers schedule change. OK to add on a Monday at 12pm?

## 2025-03-07 ENCOUNTER — HOSPITAL ENCOUNTER (OUTPATIENT)
Dept: MAMMOGRAPHY | Facility: HOSPITAL | Age: 45
Discharge: HOME | End: 2025-03-07
Attending: OBSTETRICS & GYNECOLOGY
Payer: COMMERCIAL

## 2025-03-07 ENCOUNTER — TELEPHONE (OUTPATIENT)
Dept: INTERNAL MEDICINE CLINIC | Facility: CLINIC | Age: 45
End: 2025-03-07

## 2025-03-07 DIAGNOSIS — R92.8 ABNORMAL MAMMOGRAM: ICD-10-CM

## 2025-03-07 PROCEDURE — 77065 DX MAMMO INCL CAD UNI: CPT | Performed by: OBSTETRICS & GYNECOLOGY

## 2025-03-07 PROCEDURE — 77061 BREAST TOMOSYNTHESIS UNI: CPT | Performed by: OBSTETRICS & GYNECOLOGY

## 2025-03-07 NOTE — TELEPHONE ENCOUNTER
Received Physician's Order from [facility]. Form has been placed on Dr. Denton's desk for review and signature.

## 2025-03-09 ENCOUNTER — TELEPHONE (OUTPATIENT)
Dept: OBGYN CLINIC | Facility: CLINIC | Age: 45
End: 2025-03-09

## 2025-03-09 DIAGNOSIS — R92.8 ABNORMAL SCREENING MAMMOGRAM: Primary | ICD-10-CM

## 2025-03-10 NOTE — TELEPHONE ENCOUNTER
Order entered for f/u diagnostic mammogram due to assymetry,  please help pt make this appt for 6 months from now

## 2025-04-16 ENCOUNTER — PATIENT MESSAGE (OUTPATIENT)
Dept: INTERNAL MEDICINE CLINIC | Facility: CLINIC | Age: 45
End: 2025-04-16

## 2025-04-17 NOTE — TELEPHONE ENCOUNTER
Dr. Denton     Please see [employer] messages below and advise, thank you    Patient requesting refill for Labetalol     Medication pended for your review / approval      Mayra Del Cid to P Em Rn Triage (supporting Shravan Denton MD)    4/16/25  7:12 PM  Sorry here is the filled out form. Thank you   Attachments   [email or web address]  Mayra Del Cid to P Em Rn Triage (supporting Shravan Denton MD)        4/16/25  7:08 PM  Regarding Labetalol refill request. Pharmacy is requesting email to be attached to the prescription so they can match it to my account. My Email address is [email or web address]     I've also attached their form here. Thank you so much!   Attachments

## 2025-04-18 RX ORDER — LABETALOL 100 MG/1
100 TABLET, FILM COATED ORAL 2 TIMES DAILY
Qty: 180 TABLET | Refills: 0 | Status: SHIPPED | OUTPATIENT
Start: 2025-04-18

## 2025-04-21 RX ORDER — LABETALOL 100 MG/1
100 TABLET, FILM COATED ORAL 2 TIMES DAILY
Qty: 180 TABLET | Refills: 0 | OUTPATIENT
Start: 2025-04-21

## 2025-04-21 NOTE — TELEPHONE ENCOUNTER
Disp Refills Start End    labetalol 100 MG Oral Tab 180 tablet 0 4/18/2025 --    Sig - Route: Take 1 tablet (100 mg total) by mouth 2 (two) times daily. - Oral    Sent to pharmacy as: Labetalol HCl 100 MG Oral Tablet (Normodyne)    E-Prescribing Status: Receipt confirmed by pharmacy (4/18/2025  9:51 AM CDT)      Pharmacy    [id number] [place], [place] -  [address] AT [facility] [address], [phone], [phone]

## 2025-04-21 NOTE — TELEPHONE ENCOUNTER
labetalol 100 MG Oral Tab 180 tablet 0 4/18/2025 --    Sig - Route: Take 1 tablet (100 mg total) by mouth 2 (two) times daily. - Oral    Sent to pharmacy as: Labetalol HCl 100 MG Oral Tablet (Normodyne)    E-Prescribing Status: Receipt confirmed by pharmacy (4/18/2025  9:51 AM CDT)      Pharmacy    [facility] #[id number] - [place] [address] AT [facility] [address], [phone], [phone]     Mayra COOK [facility] (supporting Shravan Denton MD)1 hour ago (9:02 AM)       Refills have been requested for the following medications:         labetalol 100 MG Oral Tab [Shravan Denton]     Preferred pharmacy: [facility] #[id number] - [place], [place] - [address] AT [facility] [address], [phone], [phone]

## 2025-04-25 ENCOUNTER — OFFICE VISIT (OUTPATIENT)
Dept: SURGERY | Facility: CLINIC | Age: 45
End: 2025-04-25
Payer: COMMERCIAL

## 2025-04-25 VITALS
OXYGEN SATURATION: 98 % | HEIGHT: 63.3 IN | HEART RATE: 92 BPM | WEIGHT: 244 LBS | BODY MASS INDEX: 42.7 KG/M2 | RESPIRATION RATE: 16 BRPM

## 2025-04-25 DIAGNOSIS — E78.5 DYSLIPIDEMIA: Primary | ICD-10-CM

## 2025-04-25 DIAGNOSIS — E66.01 MORBID OBESITY WITH BMI OF 40.0-44.9, ADULT (HCC): ICD-10-CM

## 2025-04-25 DIAGNOSIS — I10 HTN (HYPERTENSION), BENIGN: ICD-10-CM

## 2025-04-25 PROCEDURE — 99205 OFFICE O/P NEW HI 60 MIN: CPT | Performed by: INTERNAL MEDICINE

## 2025-04-25 PROCEDURE — 3008F BODY MASS INDEX DOCD: CPT | Performed by: INTERNAL MEDICINE

## 2025-04-25 NOTE — PROGRESS NOTES
[facility] Consultation Note       Patient:  Mayra Del Cid  :      7/10/1980  MRN:      [id number]    Referring Provider: Dr. Denton       Chief Complaint:    Chief Complaint   Patient presents with    Consult    Weight Management       SUBJECTIVE     History of Present Illness:  Mayra Del Cid has been referred to me for evaluation and treatment.       43 yo who lives with family  She does the cooking  Not working   Currently at heaviest weight     Patient has tried several diets in the past including exercises and is frustrated with increase of weight. Weight has been a struggle for the past several years and is now starting to develop into co-morbidities that are worrisome to the patient. Patient is interested in losing weight, so it can stay off long term.    Patient also understands that this is a life style change and wants to get on track.    Interested in non surgical weight loss    Past Medical History: Past Medical History[1]    OBJECTIVE     Vitals: Pulse 92   Resp 16   Ht 5' 3.3\" (1.608 m)   Wt 244 lb (110.7 kg)   LMP 2025 (Exact Date)   SpO2 98%   BMI 42.81 kg/m²      Patient Medications:  Current Medications[2]    Allergies:  Amoxicillin-pot clavulanate     Comorbidities:  hypertension    Social History:    Social History     Socioeconomic History    Marital status:      Spouse name: Not on file    Number of children: Not on file    Years of education: Not on file    Highest education level: Not on file   Occupational History    Not on file   Tobacco Use    Smoking status: Former     Current packs/day: 0.00     Types: Cigarettes     Quit date: 2014     Years since quitting: 10.8     Passive exposure: Past    Smokeless tobacco: Never    Tobacco comments:     Quit cold turkey   Vaping Use    Vaping status: Never Used   Substance and Sexual Activity    Alcohol use: Not Currently    Drug use: Never    Sexual activity: Not on file   Other  Topics Concern    Not on file   Social History Narrative    Not on file     Social Drivers of Health     Food Insecurity: Not on file   Transportation Needs: Not on file   Stress: Not on file   Housing Stability: Not on file     Surgical History:  Past Surgical History[3]    Family History:  Family History[4]        Typical Dietary Intake:  Breakfast AM Snack Lunch PM Snack Dinner   Rice, eggs, oatmeal  Ginger ale Chips, fruit Rice, beef, pork,  Fruit, chocolate Fillipino food   Eats quickly  Sweet tooth    Soda Drinker?: Yes  If yes, how much?:  ginger ale 4 cans diet    Number of restaurant or fast food meals/week:  3 meals/week    Nutritional Goals Reviewed and Discussed:     Limit carbohydrates to 100 gms per day, Eat 100-200 calories within 1 hour of waking up, and Eat 3-4 cups of fresh fruit or vegetables daily    Behavior Modifications Reviewed and Discussed:    Eat breakfast, Eat 3 meals per day, Plan meals in advance, Read nutrition labels, Drink 64oz of water per day, Maintain a daily food journal, No drinking 30 minutes before or after meals, Utilize portion control strategies to reduce calorie intake, Identify triggers for eating and manage cues, and Eat slowly and take 20 to 30 minutes to complete each meal      ROS:  Constitutional: positive for fatigue  Respiratory: positive for dyspnea on exertion  Cardiovascular: negative  Gastrointestinal: positive for reflux symptoms  Musculoskeletal:positive for arthralgias and back pain  Neurological: positive for headaches  Behavioral/Psych: negative  Endocrine: negative  All other systems were reviewed and are negative.      Physical Exam:  General appearance: alert, appears stated age, cooperative, and moderately obese  Head: Normocephalic, without obvious abnormality, atraumatic  Back: symmetric, no curvature. ROM normal. No CVA tenderness.  Lungs: clear to auscultation bilaterally  Heart: S1, S2 normal, no murmur, click, rub or gallop, regular rate and  rhythm  Abdomen:  soft, obese, non tender  Extremities: extremities normal, atraumatic, no cyanosis or edema  Pulses: 2+ and symmetric  Skin: Skin color, texture, turgor normal. No rashes or lesions  Neurologic: Grossly normal    ASSESSMENT     HYPERTENSION:  The patient's blood pressure has been well controlled.  she has been checking it as instructed and has remained in relatively good control.      HYPERCHOLESTEROLEMIA:  The patient states that her cholesterol has been well controlled on her current medication.    Lab Results   Component Value Date/Time    CHOLEST 170 03/03/2025 10:27 AM    LDL 96 03/03/2025 10:27 AM    HDL 41 03/03/2025 10:27 AM    TRIG 194 (H) 03/03/2025 10:27 AM    VLDL 32 (H) 03/03/2025 10:27 AM         Encounter Diagnosis(ses):   1. Dyslipidemia    2. HTN (hypertension), benign    3. Morbid obesity with BMI of 40.0-44.9, adult (HCC)        PLAN     Patient is not interested in bariatric surgery. Patient desires to pursue traditional weight loss at this time.      HYPERTENSION: Blood pressure stable on the above medications. No interval change in antihypertensive medication.     DYSLIPIDEMIA: Stable on the above prescribed meal plan and medication. Liver function stable.    Lab Results   Component Value Date/Time    CHOLEST 170 03/03/2025 10:27 AM    LDL 96 03/03/2025 10:27 AM    HDL 41 03/03/2025 10:27 AM    TRIG 194 (H) 03/03/2025 10:27 AM    VLDL 32 (H) 03/03/2025 10:27 AM       Goals for next month:  1. Keep a food log.  2. Drink 48-64 ounces of non-caloric beverages per day. No fruit juices or regular soda.  3. Increase activity-upper body exercises, walk 10 minutes per day.  4. Increase fruit and vegetable servings to 5-6 per day.      Wegovy: will start at 0.25mg    Needs to cut back on carbs    Needs to ambulate        Diagnoses and all orders for this visit:    Dyslipidemia    HTN (hypertension), benign    Morbid obesity with BMI of 40.0-44.9, adult (HCC)        Andrew Shearer  MD             [1]   Past Medical History:   DM (diabetes mellitus), gestational (HCC)    Dyslipidemia    HTN (hypertension), benign    Morbid obesity with BMI of 40.0-44.9, adult (HCC)    Pre-diabetes   [2]   Current Outpatient Medications   Medication Sig Dispense Refill    labetalol 100 MG Oral Tab Take 1 tablet (100 mg total) by mouth 2 (two) times daily. 180 tablet 0    amLODIPine 5 MG Oral Tab Take 1 tablet (5 mg total) by mouth daily. 90 tablet 3    Glucosamine-Chondroit-Calcium (TRIPLE FLEX BONE & JOINT OR) Take 1 tablet by mouth daily.      COVID-19 Specimen Collection Does not apply Kit       Ascorbic Acid (VITAMIN C) 1000 MG Oral Tab Take 1 tablet (1,000 mg total) by mouth daily.      zinc Sulfate 50 MG Oral Cap Take 50 mg by mouth daily.      COD LIVER OIL OR Take 2 capsules by mouth.      Cholecalciferol (VITAMIN D-3 OR) Take 1 capsule by mouth daily.      Multiple Vitamins-Minerals (CENTRUM) Oral Tab Take 1 tablet by mouth daily.     [3]   Past Surgical History:  Procedure Laterality Date         [4]   Family History  Problem Relation Age of Onset    Other (Other) Father         emphysema and glaucoma    Other (Other) Mother         osteoporosis    No Known Problems Brother     Breast Cancer Neg     Ovarian Cancer Neg     Colon Cancer Neg     Uterine Cancer Neg

## 2025-05-30 ENCOUNTER — OFFICE VISIT (OUTPATIENT)
Dept: PULMONOLOGY | Facility: CLINIC | Age: 45
End: 2025-05-30
Payer: COMMERCIAL

## 2025-05-30 VITALS
HEIGHT: 63.3 IN | OXYGEN SATURATION: 99 % | HEART RATE: 70 BPM | DIASTOLIC BLOOD PRESSURE: 78 MMHG | WEIGHT: 244 LBS | BODY MASS INDEX: 42.7 KG/M2 | SYSTOLIC BLOOD PRESSURE: 121 MMHG

## 2025-05-30 DIAGNOSIS — G47.33 OSA ON CPAP: Primary | ICD-10-CM

## 2025-05-30 PROCEDURE — 3078F DIAST BP <80 MM HG: CPT | Performed by: PHYSICIAN ASSISTANT

## 2025-05-30 PROCEDURE — 3008F BODY MASS INDEX DOCD: CPT | Performed by: PHYSICIAN ASSISTANT

## 2025-05-30 PROCEDURE — 99203 OFFICE O/P NEW LOW 30 MIN: CPT | Performed by: PHYSICIAN ASSISTANT

## 2025-05-30 PROCEDURE — 3074F SYST BP LT 130 MM HG: CPT | Performed by: PHYSICIAN ASSISTANT

## 2025-05-30 NOTE — PROGRESS NOTES
Pulmonary Consult Note    History of Present Illness:  Mayra Del Cid is a 44 year old female presenting to pulmonary clinic today for JUAN MANUEL. Patient had home sleep study 10/2024 which demonstrated moderate JUAN MANUEL with combined AHI 16.9. She was subsequently titrated to CPAP 11 CWP. Prior to starting CPAP, she complained of heroic snoring, frequent nocturnal awakenings, excessive daytime sleepiness, and frequent AM headaches. She has been on CPAP since 2024 and is doing very with it. She is significantly improved with resolution of daytime sleepiness, AM headaches, and nocturnal awakenings. She denies drowsy driving. Data download demonstrates average daily usage of 8 hours with residual respiratory events of 0.8/h on CPAP 11 CWP.    Past Medical History: Obstructive sleep apnea, dyslipidemia, obesity, hypertension    Past Surgical History:  section    Family Medical History: Mother alive with osteoporosis, father alive with COPD    Social History: , has 1 kid, not currently working  -Tobacco: Former smoker, quit  after having smoked 1 ppd for 15 years  -Alcohol: None    Allergies: Amoxicillin-pot clavulanate     Medications: has a current medication list which includes the following prescription(s): labetalol, amlodipine, glucosamine-chondroit-calcium, covid-19 specimen collection, vitamin c, zinc sulfate, cod liver oil, cholecalciferol, and centrum.    Review of Systems:   Constitutional: No weight loss or weight gain.  HEENT: No congestion or postnasal drip.  Cardio: No chest pain.  Respiratory: No shortness of breath.  GI: No acid reflux.  Extremities: No lower extremity swelling or pain.  Neurologic: No headache.  Psych: No depression.     Physical Exam:  /78   Pulse 70   Ht 5' 3.3\" (1.608 m)   Wt 244 lb (110.7 kg)   LMP 2025 (Exact Date)   SpO2 99%   BMI 42.81 kg/m²    Constitutional: Obese. No acute distress.  HEENT: Head NC/AT. PEERL. Crowded oropharynx. Mallampati class  4.  Cardio: Regular rate and rhythm. Normal S1 and S2. No murmurs.   Respiratory: Thorax symmetrical with no labored breathing. Clear to ausculation bilaterally with symmetrical breath sounds. No wheezing, rhonchi, or crackles.  Extremities: No clubbing or cyanosis. No LE edema. No calf tenderness.  Neurologic: A&Ox3. No gross motor deficits.  Skin: Warm, dry.  Lymphatic: No cervical or supraclavicular lymphadenopathy.  Psych: Pleasant affect. Cooperative.    Results:  -Home sleep study 10/2024: AHI 16.9    -CPAP titration study 12/2024: CPAP 11 CWP    Assessment/Plan:  JUAN MANUEL  Moderate JUAN MANUEL. Doing very well with CPAP with improvement in nocturnal awakenings and daytime sleepiness. Data download demonstrates excellent compliance and efficacy.  Plan:   -Vigilance with CPAP nightly  -Weight loss and follow up with bariatrics  -Avoid alcohol  -Avoid sedating drugs  -Never drive if sleepy  -Follow-up in 1 year again with CPAP data download    Austin Chamberlain PA-C  Pulmonary Medicine  5/30/2025

## 2025-07-07 ENCOUNTER — PATIENT MESSAGE (OUTPATIENT)
Dept: PULMONOLOGY | Facility: CLINIC | Age: 45
End: 2025-07-07

## 2025-07-09 RX ORDER — LABETALOL 100 MG/1
100 TABLET, FILM COATED ORAL 2 TIMES DAILY
Qty: 180 TABLET | Refills: 3 | Status: SHIPPED | OUTPATIENT
Start: 2025-07-09

## 2025-07-09 NOTE — TELEPHONE ENCOUNTER
Refill passes per [facility] protocol.    Future Appointments   Date Time Provider Department Center   9/8/2025  9:15 AM Shravan Denton MD [facility]

## (undated) NOTE — MR AVS SNAPSHOT
After Visit Summary   8/13/2021    Zahra Garber    MRN: [id number]           Visit Information     Date & Time  8/13/2021  2:40 PM Provider  [facility] [address] [place], [place] [address].  Phone  [phone] for ways to make improvements. Your feedback will help us do so. For more information on [employer], please visit [email or web address]                   DO YOU KNOW WHERE TO GO? [facility] are never convenient.  If you are de hours, locations or appointment options available at [facility],   visit [email or web address] or call [phone] ([phone])